# Patient Record
Sex: MALE | Race: ASIAN | NOT HISPANIC OR LATINO | Employment: FULL TIME | URBAN - METROPOLITAN AREA
[De-identification: names, ages, dates, MRNs, and addresses within clinical notes are randomized per-mention and may not be internally consistent; named-entity substitution may affect disease eponyms.]

---

## 2019-12-12 ENCOUNTER — OFFICE VISIT - HEALTHEAST (OUTPATIENT)
Dept: FAMILY MEDICINE | Facility: CLINIC | Age: 39
End: 2019-12-12

## 2019-12-12 ENCOUNTER — COMMUNICATION - HEALTHEAST (OUTPATIENT)
Dept: TELEHEALTH | Facility: CLINIC | Age: 39
End: 2019-12-12

## 2019-12-12 DIAGNOSIS — R29.810 FACIAL DROOP: ICD-10-CM

## 2019-12-12 DIAGNOSIS — I10 HYPERTENSION, UNSPECIFIED TYPE: ICD-10-CM

## 2021-06-02 ENCOUNTER — RECORDS - HEALTHEAST (OUTPATIENT)
Dept: ADMINISTRATIVE | Facility: CLINIC | Age: 41
End: 2021-06-02

## 2021-06-04 VITALS
OXYGEN SATURATION: 98 % | HEART RATE: 98 BPM | WEIGHT: 170.1 LBS | TEMPERATURE: 98 F | DIASTOLIC BLOOD PRESSURE: 112 MMHG | SYSTOLIC BLOOD PRESSURE: 156 MMHG | RESPIRATION RATE: 12 BRPM

## 2021-06-17 NOTE — PATIENT INSTRUCTIONS - HE
Patient Instructions by Geovanny Man DO at 12/12/2019 11:30 AM     Author: Geovanny Man DO Service: -- Author Type: Physician    Filed: 12/12/2019 11:57 AM Encounter Date: 12/12/2019 Status: Addendum    : Geovanny Man DO (Physician)    Related Notes: Original Note by Geovanny Man DO (Physician) filed at 12/12/2019 11:57 AM       I think you likely have Bell's palsy, but the combination of these symptoms, plus your hypertension warrants a more in-depth evaluation in the emergency department I believe.  I spoke to the physician on duty at [facility] ER about your recent symptoms, and my exam findings.  They will continue your management there.  See handout for treatment advice.    Patient Education     High Blood Pressure, To Be Confirmed, No Treatment  Your blood pressure today was higher than normal. Sometimes anxiety or pain can cause a temporary rise in blood pressure. It later returns to normal. Blood pressure that is high only one time doesnt mean that you have high blood pressure (hypertension). High blood pressure is a chronic illness. But you should have your blood pressure measured again within the next few days to find out if its still high.    Blood pressure measurements are given as 2 numbers. Systolic blood pressure is the upper number. This is the pressure when the heart contracts. Diastolic blood pressure is the lower number. This is the pressure when the heart relaxes between beats. You will see your blood pressure readings written together. For example, a person with a systolic pressure of 118 and a diastolic pressure of 78 will have 118/78 written in the medical record.  Blood pressure is categorized as normal, elevated, or stage 1 or stage 2 high blood pressure:    Normal blood pressure is systolic of less than 120 and diastolic of less than 80 (120/80)    Elevated blood pressure is systolic of 120 to 129 and diastolic less than 80    Stage 1 high blood pressure is  systolic is 130 to 139 or diastolic between 80 to 89    Stage 2 high blood pressure is when systolic is 140 or higher or the diastolic is 90 or higher  Lifestyle changes such as weight loss, exercise, and quitting smoking, can help manage your blood pressure. Have your blood pressure checked regularly to be sure it is under control.  Home care  To track your blood pressure, your provider may ask you to come into the office at different times and on different days. If your healthcare provider asks you to check your readings at home, ask him or her what times of the day to test and for how many days. Before you leave the office, ask your provider to show you how to take your blood pressure and be sure to ask questions if you don't understand something.  Consider buying an automatic blood pressure monitor. Ask your provider for a recommendation as well as the proper size cuff to fit your arm. You can buy blood pressure monitors at most pharmacies.  The American Heart Association recommends the following guidelines for home blood pressure monitoring:    Don't smoke or drink coffee or other caffeinated drinks for 30 minutes before taking your blood pressure.    Go to the bathroom before the test.    Relax for 5 minutes before taking the measurement.    Sit with your back supported (don't sit on a couch or soft chair); keep your feet on the floor uncrossed. Place your arm on a solid flat surface (like a table) with the upper part of the arm at heart level. Place the middle of the cuff directly above the bend of the elbow. Check the monitor's instruction manual for an illustration.    Take multiple readings. When you measure, take 2 to 3 readings one minute apart and record all of the results.    Take your blood pressure at the same time every day, or as your healthcare provider recommends.    Record the date, time, and blood pressure reading.    Take the record with you to your next medical appointment. If your blood  pressure monitor has a built-in memory, simply take the monitor with you to your next appointment.    Call your provider if you have several high readings. Don't be frightened by a single high blood pressure reading, but if you get several high readings, check in with your healthcare provider.    Note: When blood pressure reaches a systolic (top number) of 180 or higher OR diastolic (bottom number) of 110 or higher, seek emergency medical treatment.  Follow-up care  Keep all of your follow up appointments. If your blood pressure is more than 120 over 80 on 2 out of 3 days, you will need to follow up with your healthcare provider for more evaluation and treatment.  Dont put this off! High blood pressure can be treated. High blood pressure thats not treated raises your risk for heart attack, heart failure, and stroke.  When to seek medical advice  Call your healthcare provider right away if any of these occur:    Blood pressure reaches a systolic (top number) of 180 or higher, OR diastolic (bottom number) of 110 or higher    Chest pain or shortness of breath    Severe headache    Throbbing or rushing sound in the ears    Nosebleed    Sudden severe pain in your belly (abdomen)    Extreme drowsiness, confusion, or fainting    Dizziness or dizziness with spinning sensation (vertigo)    Weakness of an arm or leg or one side of the face    You have problems speaking or seeing   Date Last Reviewed: 12/1/2016 2000-2017 [employer]. [address] [place], [place] [id number]. All rights reserved. This information is not intended as a substitute for professional medical care. Always follow your healthcare professional's instructions.           Patient Education     [place] Palsy    V[place]adares's Palsy is a problem involving the nerve that controls the muscles on one side of the face.  The cause is unknown, but may be related to inflammation of the nerve. Symptoms usually appear only on the affected side. They may  include:    Inability to close the eyelid    Tearing of the eye    Facial drooping    Drooling    Numbness or pain    Changes in taste    Sound sensitivity  Damage to the eye can be a serious problem. The inability to blink can cause the eye to dry out. An ulcer (sore) can then form on the cornea. Also, not blinking means that the eye has no protection from dirt and dust particles.  Treatment involves protecting and moistening the eye. Medicines may also help.  Most persons recover completely within 3 to 6 months. However, the condition sometimes returns months or years later.  Home care    Get plenty of rest and eat a healthy diet to help yourself recover.    Use Artificial Tears frequently during the day and at bedtime to prevent drying. These drops are available without prescription at your drug store.    Wear protective glasses especially when outside to protect from flying debris. Use sunglasses when outdoors.    Tape the eyelid closed at bedtime with a paper tape (available at your pharmacy). This has a very mild adhesive to avoid injury to the lid. This will protect your eye from injury while you sleep.    Sometimes medicines are prescribed to reduce inflammation or treat specific viral infections of the nerve. If medicines are prescribed, take them exactly as directed. Usually there is a 10-day course of medication that is started as soon as possible. Taking this medicine as prescribed will help with a full recovery.    Use low heat, for example from a heating pad, on the affected area. This can help reduce pain and swelling.    If you are experiencing sever pain, contact your health care provider.  Follow-up care  Follow up with your healthcare provider as advised. If you referred to a specialist, make that appointment promptly.  When to seek medical advice  Call your healthcare provider if any of the following occur:    Severe eye redness    Eye pain    Thick drainage from the eye    Change in vision (such  as double vision or losing vision)    Fever over 100.4 F (38 C) or as directed by your healthcare provider    Headache, neck pain, weakness, trouble speaking or walking, or other unexplained symptoms  Date Last Reviewed: 8/23/2015 2000-2017 [employer]. [address] [place] [id number]. All rights reserved. This information is not intended as a substitute for professional medical care. Always follow your healthcare professional's instructions.

## 2021-09-02 ENCOUNTER — ANCILLARY PROCEDURE (OUTPATIENT)
Dept: ULTRASOUND IMAGING | Facility: HOSPITAL | Age: 41
End: 2021-09-02
Attending: EMERGENCY MEDICINE
Payer: COMMERCIAL

## 2021-09-02 ENCOUNTER — HOSPITAL ENCOUNTER (EMERGENCY)
Facility: HOSPITAL | Age: 41
Discharge: ANOTHER HOSPITAL | End: 2021-09-03
Attending: EMERGENCY MEDICINE
Payer: COMMERCIAL

## 2021-09-02 ENCOUNTER — APPOINTMENT (OUTPATIENT)
Dept: CT IMAGING | Facility: HOSPITAL | Age: 41
End: 2021-09-02
Attending: EMERGENCY MEDICINE
Payer: COMMERCIAL

## 2021-09-02 DIAGNOSIS — S02.2XXB OPEN FRACTURE OF NASAL BONE, INITIAL ENCOUNTER: ICD-10-CM

## 2021-09-02 DIAGNOSIS — F10.920 ALCOHOLIC INTOXICATION WITHOUT COMPLICATION (H): ICD-10-CM

## 2021-09-02 DIAGNOSIS — S01.81XA FACIAL LACERATION, INITIAL ENCOUNTER: ICD-10-CM

## 2021-09-02 DIAGNOSIS — S06.6X1A TRAUMATIC SUBARACHNOID HEMORRHAGE WITH LOSS OF CONSCIOUSNESS OF 30 MINUTES OR LESS, INITIAL ENCOUNTER (H): ICD-10-CM

## 2021-09-02 LAB
ERYTHROCYTE [DISTWIDTH] IN BLOOD BY AUTOMATED COUNT: 12.4 % (ref 10–15)
HCT VFR BLD AUTO: 44.2 % (ref 40–53)
HGB BLD-MCNC: 15.2 G/DL (ref 13.3–17.7)
MCH RBC QN AUTO: 32.7 PG (ref 26.5–33)
MCHC RBC AUTO-ENTMCNC: 34.4 G/DL (ref 31.5–36.5)
MCV RBC AUTO: 95 FL (ref 78–100)
PLATELET # BLD AUTO: 284 10E3/UL (ref 150–450)
RBC # BLD AUTO: 4.65 10E6/UL (ref 4.4–5.9)
WBC # BLD AUTO: 16.8 10E3/UL (ref 4–11)

## 2021-09-02 PROCEDURE — 70450 CT HEAD/BRAIN W/O DYE: CPT

## 2021-09-02 PROCEDURE — 96375 TX/PRO/DX INJ NEW DRUG ADDON: CPT

## 2021-09-02 PROCEDURE — 70486 CT MAXILLOFACIAL W/O DYE: CPT

## 2021-09-02 PROCEDURE — 96365 THER/PROPH/DIAG IV INF INIT: CPT

## 2021-09-02 PROCEDURE — 82077 ASSAY SPEC XCP UR&BREATH IA: CPT | Performed by: EMERGENCY MEDICINE

## 2021-09-02 PROCEDURE — 36415 COLL VENOUS BLD VENIPUNCTURE: CPT | Performed by: EMERGENCY MEDICINE

## 2021-09-02 PROCEDURE — 12011 RPR F/E/E/N/L/M 2.5 CM/<: CPT

## 2021-09-02 PROCEDURE — 80048 BASIC METABOLIC PNL TOTAL CA: CPT | Performed by: EMERGENCY MEDICINE

## 2021-09-02 PROCEDURE — C9803 HOPD COVID-19 SPEC COLLECT: HCPCS

## 2021-09-02 PROCEDURE — 99285 EMERGENCY DEPT VISIT HI MDM: CPT | Mod: 25

## 2021-09-02 PROCEDURE — 76705 ECHO EXAM OF ABDOMEN: CPT

## 2021-09-02 PROCEDURE — 85027 COMPLETE CBC AUTOMATED: CPT | Performed by: EMERGENCY MEDICINE

## 2021-09-02 PROCEDURE — 85610 PROTHROMBIN TIME: CPT | Performed by: EMERGENCY MEDICINE

## 2021-09-02 ASSESSMENT — MIFFLIN-ST. JEOR: SCORE: 1575.32

## 2021-09-03 ENCOUNTER — APPOINTMENT (OUTPATIENT)
Dept: CT IMAGING | Facility: CLINIC | Age: 41
End: 2021-09-03
Attending: EMERGENCY MEDICINE
Payer: COMMERCIAL

## 2021-09-03 ENCOUNTER — APPOINTMENT (OUTPATIENT)
Dept: RADIOLOGY | Facility: HOSPITAL | Age: 41
End: 2021-09-03
Attending: EMERGENCY MEDICINE
Payer: COMMERCIAL

## 2021-09-03 ENCOUNTER — APPOINTMENT (OUTPATIENT)
Dept: CT IMAGING | Facility: HOSPITAL | Age: 41
End: 2021-09-03
Attending: EMERGENCY MEDICINE
Payer: COMMERCIAL

## 2021-09-03 ENCOUNTER — APPOINTMENT (OUTPATIENT)
Dept: GENERAL RADIOLOGY | Facility: CLINIC | Age: 41
End: 2021-09-03
Attending: PHYSICIAN ASSISTANT
Payer: COMMERCIAL

## 2021-09-03 ENCOUNTER — APPOINTMENT (OUTPATIENT)
Dept: OCCUPATIONAL THERAPY | Facility: CLINIC | Age: 41
End: 2021-09-03
Attending: SURGERY
Payer: COMMERCIAL

## 2021-09-03 ENCOUNTER — HOSPITAL ENCOUNTER (OUTPATIENT)
Facility: CLINIC | Age: 41
Setting detail: OBSERVATION
Discharge: HOME | End: 2021-09-04
Attending: EMERGENCY MEDICINE | Admitting: SURGERY
Payer: COMMERCIAL

## 2021-09-03 VITALS
HEIGHT: 65 IN | SYSTOLIC BLOOD PRESSURE: 130 MMHG | HEART RATE: 95 BPM | OXYGEN SATURATION: 94 % | WEIGHT: 165 LBS | BODY MASS INDEX: 27.49 KG/M2 | DIASTOLIC BLOOD PRESSURE: 74 MMHG | RESPIRATION RATE: 22 BRPM

## 2021-09-03 DIAGNOSIS — Z11.52 ENCOUNTER FOR SCREENING LABORATORY TESTING FOR SEVERE ACUTE RESPIRATORY SYNDROME CORONAVIRUS 2 (SARS-COV-2): ICD-10-CM
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
## 2021-09-03 DIAGNOSIS — I10 HYPERTENSION, UNSPECIFIED TYPE: ICD-10-CM

## 2021-09-03 DIAGNOSIS — R40.2412 GLASGOW COMA SCALE SCORE 13-15, AT ARRIVAL TO EMERGENCY DEPARTMENT: ICD-10-CM

## 2021-09-03 DIAGNOSIS — G45.4 TRANSIENT GLOBAL AMNESIA: ICD-10-CM

## 2021-09-03 DIAGNOSIS — I60.9 SAH (SUBARACHNOID HEMORRHAGE) (H): ICD-10-CM

## 2021-09-03 DIAGNOSIS — I60.9 SUBARACHNOID HEMORRHAGE (H): Primary | ICD-10-CM
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
## 2021-09-03 DIAGNOSIS — S06.6X0A SUBARACHNOID HEMORRHAGE FOLLOWING INJURY, NO LOSS OF CONSCIOUSNESS, INITIAL ENCOUNTER (H): ICD-10-CM

## 2021-09-03 LAB
ALBUMIN SERPL-MCNC: 3.3 G/DL (ref 3.4–5)
ALP SERPL-CCNC: 87 U/L (ref 40–150)
ALT SERPL W P-5'-P-CCNC: 30 U/L (ref 0–70)
ANION GAP SERPL CALCULATED.3IONS-SCNC: 15 MMOL/L (ref 5–18)
ANION GAP SERPL CALCULATED.3IONS-SCNC: 9 MMOL/L (ref 3–14)
AST SERPL W P-5'-P-CCNC: 21 U/L (ref 0–45)
BILIRUB DIRECT SERPL-MCNC: 0.1 MG/DL (ref 0–0.2)
BILIRUB SERPL-MCNC: 0.9 MG/DL (ref 0.2–1.3)
BUN SERPL-MCNC: 10 MG/DL (ref 7–30)
BUN SERPL-MCNC: 11 MG/DL (ref 8–22)
CALCIUM SERPL-MCNC: 8.6 MG/DL (ref 8.5–10.1)
CALCIUM SERPL-MCNC: 9.2 MG/DL (ref 8.5–10.5)
CHLORIDE BLD-SCNC: 106 MMOL/L (ref 98–107)
CHLORIDE BLD-SCNC: 108 MMOL/L (ref 94–109)
CK SERPL-CCNC: 153 U/L (ref 30–300)
CO2 SERPL-SCNC: 19 MMOL/L (ref 22–31)
CO2 SERPL-SCNC: 21 MMOL/L (ref 20–32)
CREAT SERPL-MCNC: 0.87 MG/DL (ref 0.66–1.25)
CREAT SERPL-MCNC: 0.95 MG/DL (ref 0.7–1.3)
ERYTHROCYTE [DISTWIDTH] IN BLOOD BY AUTOMATED COUNT: 12.5 % (ref 10–15)
ETHANOL SERPL-MCNC: 243 MG/DL
GFR SERPL CREATININE-BSD FRML MDRD: >90 ML/MIN/1.73M2
GFR SERPL CREATININE-BSD FRML MDRD: >90 ML/MIN/1.73M2
GLUCOSE BLD-MCNC: 104 MG/DL (ref 70–99)
GLUCOSE BLD-MCNC: 115 MG/DL (ref 70–125)
HCT VFR BLD AUTO: 43.8 % (ref 40–53)
HGB BLD-MCNC: 15.1 G/DL (ref 13.3–17.7)
INR PPP: 0.93 (ref 0.85–1.15)
INR PPP: 1 (ref 0.85–1.15)
LACTATE SERPL-SCNC: 0.7 MMOL/L (ref 0.7–2)
LACTATE SERPL-SCNC: 2.5 MMOL/L (ref 0.7–2)
MAGNESIUM SERPL-MCNC: 1.8 MG/DL (ref 1.6–2.3)
MAGNESIUM SERPL-MCNC: 2 MG/DL (ref 1.6–2.3)
MCH RBC QN AUTO: 32.4 PG (ref 26.5–33)
MCHC RBC AUTO-ENTMCNC: 34.5 G/DL (ref 31.5–36.5)
MCV RBC AUTO: 94 FL (ref 78–100)
PHOSPHATE SERPL-MCNC: 2.4 MG/DL (ref 2.5–4.5)
PLATELET # BLD AUTO: 274 10E3/UL (ref 150–450)
POTASSIUM BLD-SCNC: 3.1 MMOL/L (ref 3.5–5)
POTASSIUM BLD-SCNC: 3.7 MMOL/L (ref 3.4–5.3)
PROT SERPL-MCNC: 7.7 G/DL (ref 6.8–8.8)
RBC # BLD AUTO: 4.66 10E6/UL (ref 4.4–5.9)
SARS-COV-2 RNA RESP QL NAA+PROBE: NEGATIVE
SODIUM SERPL-SCNC: 138 MMOL/L (ref 133–144)
SODIUM SERPL-SCNC: 140 MMOL/L (ref 136–145)
TROPONIN I SERPL-MCNC: <0.015 UG/L (ref 0–0.04)
WBC # BLD AUTO: 20.5 10E3/UL (ref 4–11)
WBC # BLD AUTO: 20.6 10E3/UL (ref 4–11)

## 2021-09-03 PROCEDURE — 83735 ASSAY OF MAGNESIUM: CPT | Performed by: SURGERY

## 2021-09-03 PROCEDURE — 99285 EMERGENCY DEPT VISIT HI MDM: CPT | Mod: 25 | Performed by: EMERGENCY MEDICINE

## 2021-09-03 PROCEDURE — 90715 TDAP VACCINE 7 YRS/> IM: CPT | Performed by: EMERGENCY MEDICINE

## 2021-09-03 PROCEDURE — 97165 OT EVAL LOW COMPLEX 30 MIN: CPT | Mod: GO | Performed by: OCCUPATIONAL THERAPIST

## 2021-09-03 PROCEDURE — 84484 ASSAY OF TROPONIN QUANT: CPT | Performed by: PHYSICIAN ASSISTANT

## 2021-09-03 PROCEDURE — 250N000013 HC RX MED GY IP 250 OP 250 PS 637: Performed by: SURGERY

## 2021-09-03 PROCEDURE — 36415 COLL VENOUS BLD VENIPUNCTURE: CPT | Performed by: SURGERY

## 2021-09-03 PROCEDURE — 258N000003 HC RX IP 258 OP 636: Performed by: SURGERY

## 2021-09-03 PROCEDURE — 258N000003 HC RX IP 258 OP 636: Performed by: EMERGENCY MEDICINE

## 2021-09-03 PROCEDURE — 82248 BILIRUBIN DIRECT: CPT | Performed by: PHYSICIAN ASSISTANT

## 2021-09-03 PROCEDURE — 74019 RADEX ABDOMEN 2 VIEWS: CPT | Mod: 26 | Performed by: RADIOLOGY

## 2021-09-03 PROCEDURE — 83735 ASSAY OF MAGNESIUM: CPT | Performed by: PHYSICIAN ASSISTANT

## 2021-09-03 PROCEDURE — 96374 THER/PROPH/DIAG INJ IV PUSH: CPT

## 2021-09-03 PROCEDURE — 87635 SARS-COV-2 COVID-19 AMP PRB: CPT | Performed by: EMERGENCY MEDICINE

## 2021-09-03 PROCEDURE — 85610 PROTHROMBIN TIME: CPT | Performed by: PHYSICIAN ASSISTANT

## 2021-09-03 PROCEDURE — 83605 ASSAY OF LACTIC ACID: CPT | Performed by: SURGERY

## 2021-09-03 PROCEDURE — 250N000013 HC RX MED GY IP 250 OP 250 PS 637: Performed by: PHYSICIAN ASSISTANT

## 2021-09-03 PROCEDURE — 71046 X-RAY EXAM CHEST 2 VIEWS: CPT

## 2021-09-03 PROCEDURE — 682N000003 HC TRAUMA EVALUATION W/O CC LEVEL II: Performed by: EMERGENCY MEDICINE

## 2021-09-03 PROCEDURE — 250N000011 HC RX IP 250 OP 636: Performed by: SURGERY

## 2021-09-03 PROCEDURE — 70450 CT HEAD/BRAIN W/O DYE: CPT | Mod: 26 | Performed by: RADIOLOGY

## 2021-09-03 PROCEDURE — 99285 EMERGENCY DEPT VISIT HI MDM: CPT | Performed by: EMERGENCY MEDICINE

## 2021-09-03 PROCEDURE — 83605 ASSAY OF LACTIC ACID: CPT | Performed by: PHYSICIAN ASSISTANT

## 2021-09-03 PROCEDURE — G0378 HOSPITAL OBSERVATION PER HR: HCPCS

## 2021-09-03 PROCEDURE — 72125 CT NECK SPINE W/O DYE: CPT

## 2021-09-03 PROCEDURE — 82550 ASSAY OF CK (CPK): CPT | Performed by: PHYSICIAN ASSISTANT

## 2021-09-03 PROCEDURE — 85027 COMPLETE CBC AUTOMATED: CPT | Performed by: PHYSICIAN ASSISTANT

## 2021-09-03 PROCEDURE — 90471 IMMUNIZATION ADMIN: CPT | Performed by: EMERGENCY MEDICINE

## 2021-09-03 PROCEDURE — 74019 RADEX ABDOMEN 2 VIEWS: CPT

## 2021-09-03 PROCEDURE — 250N000009 HC RX 250: Performed by: PHYSICIAN ASSISTANT

## 2021-09-03 PROCEDURE — 71046 X-RAY EXAM CHEST 2 VIEWS: CPT | Mod: 26 | Performed by: RADIOLOGY

## 2021-09-03 PROCEDURE — 71045 X-RAY EXAM CHEST 1 VIEW: CPT

## 2021-09-03 PROCEDURE — 84100 ASSAY OF PHOSPHORUS: CPT | Performed by: PHYSICIAN ASSISTANT

## 2021-09-03 PROCEDURE — 97535 SELF CARE MNGMENT TRAINING: CPT | Mod: GO | Performed by: OCCUPATIONAL THERAPIST

## 2021-09-03 PROCEDURE — 85048 AUTOMATED LEUKOCYTE COUNT: CPT | Performed by: PHYSICIAN ASSISTANT

## 2021-09-03 PROCEDURE — 36415 COLL VENOUS BLD VENIPUNCTURE: CPT | Performed by: PHYSICIAN ASSISTANT

## 2021-09-03 PROCEDURE — 70450 CT HEAD/BRAIN W/O DYE: CPT

## 2021-09-03 PROCEDURE — 120N000002 HC R&B MED SURG/OB UMMC

## 2021-09-03 PROCEDURE — 82040 ASSAY OF SERUM ALBUMIN: CPT | Performed by: PHYSICIAN ASSISTANT

## 2021-09-03 PROCEDURE — 250N000011 HC RX IP 250 OP 636: Performed by: EMERGENCY MEDICINE

## 2021-09-03 RX ORDER — HYDRALAZINE HYDROCHLORIDE 20 MG/ML
10 INJECTION INTRAMUSCULAR; INTRAVENOUS EVERY 4 HOURS PRN
Status: DISCONTINUED | OUTPATIENT
Start: 2021-09-03 | End: 2021-09-04 | Stop reason: HOSPADM

## 2021-09-03 RX ORDER — NALOXONE HYDROCHLORIDE 0.4 MG/ML
0.2 INJECTION, SOLUTION INTRAMUSCULAR; INTRAVENOUS; SUBCUTANEOUS
Status: DISCONTINUED | OUTPATIENT
Start: 2021-09-03 | End: 2021-09-04 | Stop reason: HOSPADM

## 2021-09-03 RX ORDER — NICOTINE 21 MG/24HR
1 PATCH, TRANSDERMAL 24 HOURS TRANSDERMAL DAILY
Status: DISCONTINUED | OUTPATIENT
Start: 2021-09-03 | End: 2021-09-04 | Stop reason: HOSPADM

## 2021-09-03 RX ORDER — LANOLIN ALCOHOL/MO/W.PET/CERES
100 CREAM (GRAM) TOPICAL DAILY
Status: DISCONTINUED | OUTPATIENT
Start: 2021-09-03 | End: 2021-09-04 | Stop reason: HOSPADM

## 2021-09-03 RX ORDER — CLONIDINE HYDROCHLORIDE 0.1 MG/1
0.1 TABLET ORAL 2 TIMES DAILY
Status: DISCONTINUED | OUTPATIENT
Start: 2021-09-03 | End: 2021-09-04 | Stop reason: HOSPADM

## 2021-09-03 RX ORDER — SODIUM CHLORIDE, SODIUM LACTATE, POTASSIUM CHLORIDE, CALCIUM CHLORIDE 600; 310; 30; 20 MG/100ML; MG/100ML; MG/100ML; MG/100ML
1000 INJECTION, SOLUTION INTRAVENOUS CONTINUOUS
Status: DISCONTINUED | OUTPATIENT
Start: 2021-09-03 | End: 2021-09-04 | Stop reason: HOSPADM

## 2021-09-03 RX ORDER — METOPROLOL TARTRATE 1 MG/ML
5 INJECTION, SOLUTION INTRAVENOUS EVERY 5 MIN PRN
Status: DISCONTINUED | OUTPATIENT
Start: 2021-09-03 | End: 2021-09-04 | Stop reason: HOSPADM

## 2021-09-03 RX ORDER — VITAMIN B COMPLEX
50 TABLET ORAL DAILY
Status: DISCONTINUED | OUTPATIENT
Start: 2021-09-03 | End: 2021-09-04 | Stop reason: HOSPADM

## 2021-09-03 RX ORDER — QUETIAPINE FUMARATE 25 MG/1
25-50 TABLET, FILM COATED ORAL EVERY 6 HOURS PRN
Status: DISCONTINUED | OUTPATIENT
Start: 2021-09-03 | End: 2021-09-04 | Stop reason: HOSPADM

## 2021-09-03 RX ORDER — MULTIPLE VITAMINS W/ MINERALS TAB 9MG-400MCG
1 TAB ORAL DAILY
Status: DISCONTINUED | OUTPATIENT
Start: 2021-09-03 | End: 2021-09-04 | Stop reason: HOSPADM

## 2021-09-03 RX ORDER — ACETAMINOPHEN 325 MG/1
975 TABLET ORAL 3 TIMES DAILY
Status: DISCONTINUED | OUTPATIENT
Start: 2021-09-03 | End: 2021-09-04

## 2021-09-03 RX ORDER — NALOXONE HYDROCHLORIDE 0.4 MG/ML
0.4 INJECTION, SOLUTION INTRAMUSCULAR; INTRAVENOUS; SUBCUTANEOUS
Status: DISCONTINUED | OUTPATIENT
Start: 2021-09-03 | End: 2021-09-04 | Stop reason: HOSPADM

## 2021-09-03 RX ORDER — POTASSIUM CHLORIDE 750 MG/1
20 TABLET, EXTENDED RELEASE ORAL ONCE
Status: COMPLETED | OUTPATIENT
Start: 2021-09-03 | End: 2021-09-03

## 2021-09-03 RX ORDER — FOLIC ACID 1 MG/1
1 TABLET ORAL DAILY
Status: DISCONTINUED | OUTPATIENT
Start: 2021-09-03 | End: 2021-09-04 | Stop reason: HOSPADM

## 2021-09-03 RX ORDER — CEFAZOLIN SODIUM 1 G/3ML
1 INJECTION, POWDER, FOR SOLUTION INTRAMUSCULAR; INTRAVENOUS ONCE
Status: COMPLETED | OUTPATIENT
Start: 2021-09-03 | End: 2021-09-03

## 2021-09-03 RX ORDER — METHOCARBAMOL 750 MG/1
750 TABLET, FILM COATED ORAL 3 TIMES DAILY
Status: DISCONTINUED | OUTPATIENT
Start: 2021-09-03 | End: 2021-09-04

## 2021-09-03 RX ORDER — ONDANSETRON 2 MG/ML
4 INJECTION INTRAMUSCULAR; INTRAVENOUS EVERY 6 HOURS PRN
Status: DISCONTINUED | OUTPATIENT
Start: 2021-09-03 | End: 2021-09-04 | Stop reason: HOSPADM

## 2021-09-03 RX ORDER — ONDANSETRON 4 MG/1
4 TABLET, ORALLY DISINTEGRATING ORAL EVERY 6 HOURS PRN
Status: DISCONTINUED | OUTPATIENT
Start: 2021-09-03 | End: 2021-09-04 | Stop reason: HOSPADM

## 2021-09-03 RX ADMIN — CLOSTRIDIUM TETANI TOXOID ANTIGEN (FORMALDEHYDE INACTIVATED), CORYNEBACTERIUM DIPHTHERIAE TOXOID ANTIGEN (FORMALDEHYDE INACTIVATED), BORDETELLA PERTUSSIS TOXOID ANTIGEN (GLUTARALDEHYDE INACTIVATED), BORDETELLA PERTUSSIS FILAMENTOUS HEMAGGLUTININ ANTIGEN (FORMALDEHYDE INACTIVATED), BORDETELLA PERTUSSIS PERTACTIN ANTIGEN, AND BORDETELLA PERTUSSIS FIMBRIAE 2/3 ANTIGEN 0.5 ML: 5; 2; 2.5; 5; 3; 5 INJECTION, SUSPENSION INTRAMUSCULAR at 00:49

## 2021-09-03 RX ADMIN — CLONIDINE HYDROCHLORIDE 0.1 MG: 0.1 TABLET ORAL at 19:36

## 2021-09-03 RX ADMIN — ONDANSETRON 4 MG: 4 TABLET, ORALLY DISINTEGRATING ORAL at 08:38

## 2021-09-03 RX ADMIN — POTASSIUM & SODIUM PHOSPHATES POWDER PACK 280-160-250 MG 1 PACKET: 280-160-250 PACK at 19:35

## 2021-09-03 RX ADMIN — Medication 50 MCG: at 07:48

## 2021-09-03 RX ADMIN — SODIUM CHLORIDE, POTASSIUM CHLORIDE, SODIUM LACTATE AND CALCIUM CHLORIDE 1000 ML: 600; 310; 30; 20 INJECTION, SOLUTION INTRAVENOUS at 05:26

## 2021-09-03 RX ADMIN — FOLIC ACID 1 MG: 1 TABLET ORAL at 09:47

## 2021-09-03 RX ADMIN — POTASSIUM CHLORIDE 20 MEQ: 750 TABLET, EXTENDED RELEASE ORAL at 12:24

## 2021-09-03 RX ADMIN — CEFAZOLIN 1 G: 1 INJECTION, POWDER, FOR SOLUTION INTRAMUSCULAR; INTRAVENOUS at 01:11

## 2021-09-03 RX ADMIN — POTASSIUM & SODIUM PHOSPHATES POWDER PACK 280-160-250 MG 1 PACKET: 280-160-250 PACK at 12:24

## 2021-09-03 RX ADMIN — METOPROLOL TARTRATE 5 MG: 1 INJECTION, SOLUTION INTRAVENOUS at 08:38

## 2021-09-03 RX ADMIN — LEVETIRACETAM 1000 MG: 100 INJECTION, SOLUTION INTRAVENOUS at 01:31

## 2021-09-03 RX ADMIN — METHOCARBAMOL 750 MG: 750 TABLET ORAL at 14:03

## 2021-09-03 RX ADMIN — ACETAMINOPHEN 975 MG: 325 TABLET, FILM COATED ORAL at 19:35

## 2021-09-03 RX ADMIN — ACETAMINOPHEN 975 MG: 325 TABLET, FILM COATED ORAL at 07:48

## 2021-09-03 RX ADMIN — METHOCARBAMOL 750 MG: 750 TABLET ORAL at 09:47

## 2021-09-03 RX ADMIN — CLONIDINE HYDROCHLORIDE 0.1 MG: 0.1 TABLET ORAL at 09:47

## 2021-09-03 RX ADMIN — THIAMINE HCL TAB 100 MG 100 MG: 100 TAB at 09:47

## 2021-09-03 RX ADMIN — METHOCARBAMOL 750 MG: 750 TABLET ORAL at 19:36

## 2021-09-03 RX ADMIN — MULTIPLE VITAMINS W/ MINERALS TAB 1 TABLET: TAB at 09:47

## 2021-09-03 RX ADMIN — ACETAMINOPHEN 975 MG: 325 TABLET, FILM COATED ORAL at 14:03

## 2021-09-03 ASSESSMENT — ACTIVITIES OF DAILY LIVING (ADL)
HEARING_DIFFICULTY_OR_DEAF: NO
DIFFICULTY_COMMUNICATING: NO
TOILETING_ISSUES: NO
ADLS_ACUITY_SCORE: 14
ADLS_ACUITY_SCORE: 14
DRESSING/BATHING_DIFFICULTY: NO
CONCENTRATING,_REMEMBERING_OR_MAKING_DECISIONS_DIFFICULTY: NO
WALKING_OR_CLIMBING_STAIRS_DIFFICULTY: NO
WEAR_GLASSES_OR_BLIND: NO
ADLS_ACUITY_SCORE: 14
DIFFICULTY_EATING/SWALLOWING: NO
DOING_ERRANDS_INDEPENDENTLY_DIFFICULTY: NO
FALL_HISTORY_WITHIN_LAST_SIX_MONTHS: NO
PREVIOUS_RESPONSIBILITIES: HOUSEKEEPING;LAUNDRY;SHOPPING;YARDWORK;MEDICATION MANAGEMENT;FINANCES;DRIVING;WORK
ADLS_ACUITY_SCORE: 14

## 2021-09-03 ASSESSMENT — MIFFLIN-ST. JEOR: SCORE: 1559.44

## 2021-09-03 NOTE — PROVIDER NOTIFICATION
Lab called with LA=2.5 FL=680 WBC=16.8  Code sepsis called, waiting for team to arrive.  Trauma PA (Dorcas Erickson) paged, she called back and no further interventions at this time were added at this time.   Pt resting comfortably, IV fluids currently running. Assigned RN (Huber) notified.   CN notified and updated on situation.
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
## 2021-09-03 NOTE — PLAN OF CARE
0600: Writer paged trauma team.   [id number] [id number]. Sharla. Pt arrived to the floor and met criteria for sepsis for WBC 16.8, .  Thank you, JEFF Kim [phone].
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
## 2021-09-03 NOTE — PHARMACY-ADMISSION MEDICATION HISTORY
Admission Medication History Completed by Pharmacy    See [facility] Admission Navigator for allergy information, preferred outpatient pharmacy, prior to admission medications and immunization status.     Medication History Sources:     Patient    Changes made to PTA medication list (reason):    Added: None    Deleted: methylprednisolone (course completed)    Changed: None    Additional Information:    None    Prior to Admission medications    Not on File       Date completed: 09/03/21    Medication history completed by: Isela Medellin PharmD, BCPS  9/3/2021 8:57 AM

## 2021-09-03 NOTE — ED NOTES
Bed: ED02  Expected date:   Expected time:   Means of arrival:   Comments:  Yumi Harrell8 [facility]
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
## 2021-09-04 VITALS
HEART RATE: 77 BPM | DIASTOLIC BLOOD PRESSURE: 107 MMHG | WEIGHT: 165 LBS | TEMPERATURE: 96.7 F | BODY MASS INDEX: 28.17 KG/M2 | OXYGEN SATURATION: 95 % | RESPIRATION RATE: 16 BRPM | HEIGHT: 64 IN | SYSTOLIC BLOOD PRESSURE: 153 MMHG

## 2021-09-04 DIAGNOSIS — I60.9 SAH (SUBARACHNOID HEMORRHAGE) (H): Primary | ICD-10-CM

## 2021-09-04 LAB
ALBUMIN SERPL-MCNC: 3.1 G/DL (ref 3.4–5)
ALP SERPL-CCNC: 83 U/L (ref 40–150)
ALT SERPL W P-5'-P-CCNC: 24 U/L (ref 0–70)
ANION GAP SERPL CALCULATED.3IONS-SCNC: 7 MMOL/L (ref 3–14)
AST SERPL W P-5'-P-CCNC: 16 U/L (ref 0–45)
BILIRUB SERPL-MCNC: 1.4 MG/DL (ref 0.2–1.3)
BUN SERPL-MCNC: 12 MG/DL (ref 7–30)
CALCIUM SERPL-MCNC: 8.9 MG/DL (ref 8.5–10.1)
CHLORIDE BLD-SCNC: 106 MMOL/L (ref 94–109)
CO2 SERPL-SCNC: 25 MMOL/L (ref 20–32)
CREAT SERPL-MCNC: 0.98 MG/DL (ref 0.66–1.25)
ERYTHROCYTE [DISTWIDTH] IN BLOOD BY AUTOMATED COUNT: 12.4 % (ref 10–15)
GFR SERPL CREATININE-BSD FRML MDRD: >90 ML/MIN/1.73M2
GLUCOSE BLD-MCNC: 100 MG/DL (ref 70–99)
HCT VFR BLD AUTO: 42.1 % (ref 40–53)
HGB BLD-MCNC: 14.5 G/DL (ref 13.3–17.7)
MCH RBC QN AUTO: 32.7 PG (ref 26.5–33)
MCHC RBC AUTO-ENTMCNC: 34.4 G/DL (ref 31.5–36.5)
MCV RBC AUTO: 95 FL (ref 78–100)
PLATELET # BLD AUTO: 261 10E3/UL (ref 150–450)
POTASSIUM BLD-SCNC: 4 MMOL/L (ref 3.4–5.3)
PROT SERPL-MCNC: 7.6 G/DL (ref 6.8–8.8)
RBC # BLD AUTO: 4.44 10E6/UL (ref 4.4–5.9)
SODIUM SERPL-SCNC: 138 MMOL/L (ref 133–144)
WBC # BLD AUTO: 16.6 10E3/UL (ref 4–11)

## 2021-09-04 PROCEDURE — 250N000013 HC RX MED GY IP 250 OP 250 PS 637: Performed by: PHYSICIAN ASSISTANT

## 2021-09-04 PROCEDURE — G0378 HOSPITAL OBSERVATION PER HR: HCPCS

## 2021-09-04 PROCEDURE — 96375 TX/PRO/DX INJ NEW DRUG ADDON: CPT

## 2021-09-04 PROCEDURE — 250N000011 HC RX IP 250 OP 636: Performed by: PHYSICIAN ASSISTANT

## 2021-09-04 PROCEDURE — 80053 COMPREHEN METABOLIC PANEL: CPT | Performed by: PHYSICIAN ASSISTANT

## 2021-09-04 PROCEDURE — 85027 COMPLETE CBC AUTOMATED: CPT | Performed by: PHYSICIAN ASSISTANT

## 2021-09-04 PROCEDURE — 99239 HOSP IP/OBS DSCHRG MGMT >30: CPT | Performed by: PHYSICIAN ASSISTANT

## 2021-09-04 PROCEDURE — 250N000013 HC RX MED GY IP 250 OP 250 PS 637: Performed by: SURGERY

## 2021-09-04 PROCEDURE — 258N000003 HC RX IP 258 OP 636: Performed by: SURGERY

## 2021-09-04 PROCEDURE — 36415 COLL VENOUS BLD VENIPUNCTURE: CPT | Performed by: PHYSICIAN ASSISTANT

## 2021-09-04 RX ORDER — CHLORTHALIDONE 25 MG/1
25 TABLET ORAL DAILY
Status: DISCONTINUED | OUTPATIENT
Start: 2021-09-04 | End: 2021-09-04 | Stop reason: HOSPADM

## 2021-09-04 RX ORDER — LEVETIRACETAM 750 MG/1
750 TABLET ORAL 2 TIMES DAILY
Qty: 8 TABLET | Refills: 0 | Status: SHIPPED | OUTPATIENT
Start: 2021-09-04 | End: 2023-10-05

## 2021-09-04 RX ORDER — ACETAMINOPHEN 325 MG/1
975 TABLET ORAL EVERY 8 HOURS PRN
Status: DISCONTINUED | OUTPATIENT
Start: 2021-09-04 | End: 2021-09-04 | Stop reason: HOSPADM

## 2021-09-04 RX ORDER — METHOCARBAMOL 750 MG/1
750 TABLET, FILM COATED ORAL 3 TIMES DAILY PRN
Qty: 20 TABLET | Refills: 0 | Status: SHIPPED | OUTPATIENT
Start: 2021-09-04

## 2021-09-04 RX ORDER — CHLORTHALIDONE 25 MG/1
25 TABLET ORAL DAILY
Qty: 30 TABLET | Refills: 0 | Status: SHIPPED | OUTPATIENT
Start: 2021-09-04

## 2021-09-04 RX ORDER — METHOCARBAMOL 750 MG/1
750 TABLET, FILM COATED ORAL 3 TIMES DAILY PRN
Status: DISCONTINUED | OUTPATIENT
Start: 2021-09-04 | End: 2021-09-04 | Stop reason: HOSPADM

## 2021-09-04 RX ORDER — LEVETIRACETAM 750 MG/1
750 TABLET ORAL 2 TIMES DAILY
Qty: 8 TABLET | Refills: 0 | Status: SHIPPED | OUTPATIENT
Start: 2021-09-04 | End: 2021-09-08

## 2021-09-04 RX ADMIN — MULTIPLE VITAMINS W/ MINERALS TAB 1 TABLET: TAB at 08:07

## 2021-09-04 RX ADMIN — HYDRALAZINE HYDROCHLORIDE 10 MG: 20 INJECTION INTRAMUSCULAR; INTRAVENOUS at 08:16

## 2021-09-04 RX ADMIN — Medication 50 MCG: at 08:07

## 2021-09-04 RX ADMIN — CLONIDINE HYDROCHLORIDE 0.1 MG: 0.1 TABLET ORAL at 08:07

## 2021-09-04 RX ADMIN — FOLIC ACID 1 MG: 1 TABLET ORAL at 08:07

## 2021-09-04 RX ADMIN — POTASSIUM & SODIUM PHOSPHATES POWDER PACK 280-160-250 MG 1 PACKET: 280-160-250 PACK at 08:07

## 2021-09-04 RX ADMIN — THIAMINE HCL TAB 100 MG 100 MG: 100 TAB at 08:07

## 2021-09-04 RX ADMIN — SODIUM CHLORIDE, POTASSIUM CHLORIDE, SODIUM LACTATE AND CALCIUM CHLORIDE 1000 ML: 600; 310; 30; 20 INJECTION, SOLUTION INTRAVENOUS at 01:08

## 2021-09-04 RX ADMIN — CHLORTHALIDONE 25 MG: 25 TABLET ORAL at 11:53

## 2021-09-04 ASSESSMENT — ACTIVITIES OF DAILY LIVING (ADL)
ADLS_ACUITY_SCORE: 14

## 2021-09-04 NOTE — PLAN OF CARE
Occupational Therapy Discharge Summary    Reason for therapy discharge:    Discharged to home.    Progress towards therapy goal(s). See goals on Care Plan in [facility] electronic health record for goal details.  Goals partially met.  Barriers to achieving goals:   discharge from facility.    Therapy recommendation(s):    Not seen for therapies today. Previous therapist recommending assist for heavier ADL/IADL prn.
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
## 2021-09-04 NOTE — PROGRESS NOTES
Brief SW Note:    SW reviewed PT/OT notes/recs, no acute needs for either. OT recs pt return home with assistance. SW spoke with bedside RN, pt discharging home in one hour being  by his Sister. SW spoke with pt, he denied having any SW needs. Consult cleared.    PADMINI Montgomery on 9/4/2021 at 11:10 AM

## 2021-09-04 NOTE — DISCHARGE INSTRUCTIONS
High Blood Pressure, New, Begin Treatment    Your blood pressure was high enough today to start treatment with medicines. Often healthcare providers don t know what causes high blood pressure (hypertension). But it can be controlled with lifestyle changes and medicines. High blood pressure usually has no symptoms. But it can sometimes cause headache, dizziness, blurred vision, a rushing sound in your ears, chest pain, or shortness of breath. But even without symptoms, high blood pressure that s not treated raises your risk for heart attack, heart failure, kidney disease, vascular disease, and stroke. High blood pressure is a serious health risk and shouldn t be ignored.    Blood pressure measurements are given as 2 numbers.    Systolic blood pressure is the upper number. This is the pressure when the heart contracts.    Diastolic blood pressure is the lower number. This is the pressure when the heart relaxes between beats.  You will see your blood pressure readings written together. For example, a person with a systolic pressure of 118 and a diastolic pressure of 78 will have 118/78 written in the medical record.   Blood pressure is categorized as normal, elevated, or stage 1 or stage 2 high blood pressure:    Normal blood pressure is systolic of less than 120 and diastolic of less than 80 (120/80)    Elevated blood pressure is systolic of 120 to 129 and diastolic less than 80    Stage 1 high blood pressure is systolic is 130 to 139 or diastolic between 80 to 89    Stage 2 high blood pressure is when systolic is 140 or higher or the diastolic is 90 or higher  Home care  If you have high blood pressure, do what's listed below to lower your blood pressure. If you are taking medicines for high blood pressure, these methods may reduce or end your need for medicines in the future.    Begin a weight-loss program if you are overweight.    Cut back on how much salt you get in your diet. Here s how to do this:  ? Don t eat  foods that have a lot of salt. These include olives, pickles, smoked meats, and salted potato chips.  ? Don t add salt to your food at the table.  ? Use only small amounts of salt when cooking.  ? Review food labels to track how much salt is in prepared foods.  ? When eating out, ask that no additional salt be added to your food order.  ? Ask your provider about the DASH diet or the DASH (dietary approaches to stop hypertension) eating plan.    Start an exercise program. Talk with your healthcare provider about the type of exercise program that would be best for you. It doesn't have to be hard. Even brisk walking for 20 minutes 3 times a week is a good form of exercise.    Don t take medicines that have heart stimulants. This includes many over-the-counter cold and sinus decongestant pills and sprays, as well as diet pills. Check the warnings about high blood pressure on the label. Before purchasing any over-the-counter medicines or supplements, always ask the pharmacist about the product's potential interaction with your high blood pressure and your high blood pressure medicines.    Stimulants such as amphetamine or cocaine could be lethal for someone with high blood pressure. Never take these.    Limit how much caffeine you get in your diet. Switch to caffeine-free products.    Stop smoking. If you are a long-time smoker, this can be hard. Enroll in a stop-smoking program to make it more likely that you will quit for good. Or, talk with your healthcare provider about nicotine replacements or medicines that can help.    Learn how to handle stress. This is an important part of any program to lower blood pressure. Learn about relaxation methods like meditation, yoga, or biofeedback.    If your provider prescribed medicines, take them exactly as directed. Missing doses may cause your blood pressure to get out of control.    If you miss a dose or doses, check with your healthcare provider or pharmacist about what to  do.    Limit alcohol. Drinking too much alcohol can raise blood pressure. Men should have no more than 2 drinks a day. Women should have no more than 1. A drink is equal to 1 beer, or a small glass of wine, or a shot of liquor..    Consider buying an automatic blood pressure machine so you can check your blood pressure regularly at home. Your provider can make a recommendation. You can get one of these at most pharmacies.  The American Heart Association recommends the following guidelines for home blood pressure monitoring:    Don't smoke or drink coffee or other caffeinated drinks or exercise for 30 minutes before taking your blood pressure.    Go to the bathroom before the test.    Relax for 5 minutes before taking the measurement.    Sit with your back supported (don't sit on a couch or soft chair); keep your feet on the floor uncrossed. Place your arm on a solid flat surface (like a table) with the upper part of the arm at heart level. Place the middle of the cuff directly above the bend of the elbow. Check the monitor's instruction manual for an illustration.    Take multiple readings. When you measure, take 2 to 3 readings one minute apart and record all of the results.    Take your blood pressure at the same time every day, or as your healthcare provider recommends.    Record the date, time, and blood pressure reading.    Take the record with you to your next medical appointment. If your blood pressure monitor has a built-in memory, simply take the monitor with you to your next appointment.    Call your provider if you have several high readings. Don't be frightened by a single high blood pressure reading, but if you get several high readings, check in with your healthcare provider.    Note: If blood pressure reaches a systolic (top number) of 180 or higher OR diastolic (bottom number) of 120 or higher, seek emergency medical treatment.  Follow-up care  Because a new blood pressure medicine was started today,  it s important that you have your blood pressure rechecked. This is to make sure that the medicine is working and that you have no serious side effects. Keep all your follow up appointments. Write down medicine and blood pressure questions and bring them to your next appointment. If you have pressing concerns about your new medicine or your blood pressure, call your provider. Unless told otherwise, follow up with your healthcare provider within the next 3 days.  When to seek medical care  Call your healthcare provider right away if any of these occur:    Blood pressure reaches a systolic (top number) of 180 or higher, OR diastolic (bottom number) of 120 or higher, seek emergency medical treatment.    Chest pain or shortness of breath    Severe headache    Throbbing or rushing sound in the ears    Nosebleed    Sudden severe pain in your belly (abdomen)    Extreme drowsiness, confusion, or fainting    Dizziness or dizziness with a spinning sensation (vertigo)    Weakness of an arm or leg or one side of the face    You have problems speaking or seeing   [employer] last reviewed this educational content on 12/1/2019 2000-2021 [employer]. All rights reserved. This information is not intended as a substitute for professional medical care. Always follow your healthcare professional's instructions.        Coping with Concussion  Concussion is also known as mild traumatic brain injury (MTBI). It is often caused by a blow to the head, or a fall. You may have been unconscious for a few seconds or minutes after the injury. Or maybe you were dazed, confused, or  saw stars.  After this, you thought you were OK. Now, weeks or months later, you re having symptoms that may be caused by a concussion. The good news is that, in most people,  these symptoms will likely go away on their own. Most people with a concussion recover fully, with no need for treatment.     A cold compress can help relieve a headache.    What is  a concussion?  A concussion is a mild form of brain injury. In some cases, the effects of a concussion go away within days of the injury. In others, symptoms may continue for a few months. Fortunately, a concussion is temporary. Even when symptoms stay for months, they do go away over time. If they don't, or if your symptoms are worse, contact your healthcare provider.  Symptoms of a concussion  You may have noticed some of these symptoms:    Headaches    Irritability and other changes in behavior    Problems remembering or concentrating    Dizziness or lack of coordination    Fatigue    Problems sleeping    Sensitivity to light and sound    Vision changes  NOTE: If you have severe symptoms or trouble functioning, talk with your healthcare provider right away. If you had a more serious head injury than a concussion, you likely need treatment. Be sure to see your healthcare provider for an evaluation.  What you can do  Since the effects of a concussion go away over time, there isn t a lot you need to do. Be assured that this problem is temporary. You ll likely have a full recovery. In the meantime, talk with your healthcare provider about ways to relieve any symptoms that are bothering you. These tips may help:    Don't return to sports or any activity that could cause you to hit your head until all symptoms are gone and you have been cleared by your doctor. A second head injury before fully recovering from the first one can lead to serious brain injury.    Return to normal activities of daily living and normal social interaction is encouraged to speed recovery.    Stress can make symptoms worse. Help calm yourself by resting in a quiet place and imagining a peaceful scene. Relax your muscles by soaking in a hot bath or taking a hot shower.    Take over-the-counter  acetaminophen to relieve headache pain. Take them as directed on the package. Don't take ibuprofen or aspirin after a head injury.    If you become  dizzy, sit or lie down in a safe place until the sensation passes. Don t drive when you feel dizzy or disoriented.    If you re having trouble sleeping, try to keep a regular sleep schedule. Go to bed and get up at the same time each day. Avoid or limit caffeine and nicotine. Also don't drink alcohol. It may help you sleep at first, but your sleep will not be restful.    Give yourself time to heal. Your recovery will take some time. When you have symptoms, remember that you won t feel this way forever. In time the symptoms will go away and you ll be back to yourself.  If you re not feeling better  The effects of a concussion often go away in 7 to 10 days and the vast majority of people who have had a concussion have recovered after 3 months. If you re not feeling better as time passes, there may be something else going on. If your symptoms don t go away or you notice new ones, talk with your healthcare provider. He or she can help you get the treatment you need.  [employer] last reviewed this educational content on 1/1/2018 2000-2021 [employer]. All rights reserved. This information is not intended as a substitute for professional medical care. Always follow your healthcare professional's instructions.
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
## 2021-09-07 ENCOUNTER — TELEPHONE (OUTPATIENT)
Dept: NEUROSURGERY | Facility: CLINIC | Age: 41
End: 2021-09-07

## 2021-09-07 NOTE — TELEPHONE ENCOUNTER
Writer NICHOL for pt to call back and schedule 2 week follow up.    Please schedule a visit in two weeks (around 9/16) with Brigitte Murphy or Natasha Wells, in person or virtual is good. Please also schedule CT (ordered) prior to visit, can be same day    Elisa Jacome

## 2021-09-21 NOTE — TELEPHONE ENCOUNTER
FUTURE VISIT INFORMATION      FUTURE VISIT INFORMATION:    Date: 9/28/2021    Time: 930am    Location: [facility]  REFERRAL INFORMATION:    Referring provider:  Dr. Madrid    Referring providers clinic:  [facility] ED     Reason for visit/diagnosis  ED Follow-up     RECORDS REQUESTED FROM:       Clinic name Comments Records Status Imaging Status   Internal ED Visit-9/2/2021, 9/3/2021    CT Head-9/28/2021, 9/3/2021, 9/2/2021    CT Cervical Spine-9/3/2021    CT Facial Bones-9/2/2021 Epic PACS

## 2021-09-28 ENCOUNTER — ANCILLARY PROCEDURE (OUTPATIENT)
Dept: CT IMAGING | Facility: CLINIC | Age: 41
End: 2021-09-28
Attending: STUDENT IN AN ORGANIZED HEALTH CARE EDUCATION/TRAINING PROGRAM
Payer: COMMERCIAL

## 2021-09-28 ENCOUNTER — PRE VISIT (OUTPATIENT)
Dept: NEUROSURGERY | Facility: CLINIC | Age: 41
End: 2021-09-28

## 2021-09-28 ENCOUNTER — OFFICE VISIT (OUTPATIENT)
Dept: NEUROSURGERY | Facility: CLINIC | Age: 41
End: 2021-09-28
Payer: COMMERCIAL

## 2021-09-28 VITALS
SYSTOLIC BLOOD PRESSURE: 147 MMHG | WEIGHT: 171 LBS | HEART RATE: 75 BPM | OXYGEN SATURATION: 96 % | BODY MASS INDEX: 29.35 KG/M2 | RESPIRATION RATE: 16 BRPM | DIASTOLIC BLOOD PRESSURE: 108 MMHG

## 2021-09-28 DIAGNOSIS — I60.9 SAH (SUBARACHNOID HEMORRHAGE) (H): ICD-10-CM

## 2021-09-28 DIAGNOSIS — I60.9 SUBARACHNOID HEMORRHAGE (H): Primary | ICD-10-CM

## 2021-09-28 PROCEDURE — 70450 CT HEAD/BRAIN W/O DYE: CPT | Mod: GC | Performed by: RADIOLOGY

## 2021-09-28 PROCEDURE — 99213 OFFICE O/P EST LOW 20 MIN: CPT | Performed by: PHYSICIAN ASSISTANT

## 2021-09-28 ASSESSMENT — PAIN SCALES - GENERAL: PAINLEVEL: NO PAIN (0)

## 2021-09-28 NOTE — LETTER
Date:December 2, 2021      Patient was self referred, no letter generated. Do not send.        [facility] Health Information       Past Medical History:   Diagnosis Date    Actinic keratosis     Anemia     Basal cell carcinoma     Carotid stenosis     CLL (chronic lymphocytic leukemia)     Diabetes mellitus 2014    Steroid related    Hyperlipidemia     Hypertension     Hypopituitarism     Hypothyroid     Immunosuppression 3/13/2015    Squamous Cell Carcinoma     on the right side of the face     Squamous cell carcinoma of skin of right temple 1/27/2016    Stroke     Syncope 2/12    Unspecified disorder of kidney and ureter        Past Surgical History:   Procedure Laterality Date    CAROTID ENDARTERECTOMY (L)  2/27/2007    EXCISION TURBINATE, SUBMUCOUS      FESS  9/16/2014    Mohs excision   3/23/2015, 2/24/2016    combined with WLE forehead    NASAL SEPTUM SURGERY  9/16/2014    right ureter surgery  1995    SENTINEL LYMPH NODE BIOPSY  3/23/2015, 2/24/2016    SINUS SURGERY  9/16/2014    septo, ESS, BITSMR    Transphenoidal surgery         Review of patient's allergies indicates:   Allergen Reactions    Ace inhibitors Swelling     angioedema    Divalproex Hives     Rash under arms, body creases       No current facility-administered medications on file prior to encounter.      Current Outpatient Medications on File Prior to Encounter   Medication Sig    ACCU-CHEK FASTCLIX Misc TEST  BLOOD  GLUCOSE FOUR TIMES DAILY    alcohol swabs PadM APPLY 1 PAD TOPICALLY  AS NEEDED.    aspirin 81 MG Chew Take 81 mg by mouth every Mon, Wed, Fri.    atorvastatin (LIPITOR) 20 MG tablet TAKE 1 TABLET EVERY DAY    budesonide (PULMICORT) 0.5 mg/2 mL nebulizer solution budesonide 0.5 mg/2 mL suspension for nebulization    chlorhexidine (PERIDEX) 0.12 % solution chlorhexidine gluconate 0.12 % mouthwash    clopidogrel (PLAVIX) 75 mg tablet Take 75 mg by mouth once daily.    cyanocobalamin, vitamin B-12, 5,000 mcg TbDL Take 1 tablet by mouth once daily.    cycloSPORINE (RESTASIS) 0.05 % ophthalmic emulsion Place 0.4 mLs (1 drop  total) into both eyes 2 (two) times daily.    DOCOSAHEXANOIC ACID/EPA (FISH OIL ORAL) Take 5 capsules by mouth once daily.     erythromycin (ROMYCIN) ophthalmic ointment Apply to eyelids and lashes OU QHS    fluorouracil (EFUDEX) 5 % cream Use hs for 2 weeks on right cheek    furosemide (LASIX) 20 MG tablet     guaifen/dextromethorphan/pe (ROBITUSSIN COUGH & COLD CF MAX ORAL) Take 15 mLs by mouth 2 (two) times daily.    levothyroxine (SYNTHROID) 125 MCG tablet TAKE 1 TABLET ONE TIME DAILY    losartan (COZAAR) 25 MG tablet Take 1 tablet (25 mg total) by mouth once daily.    multivitamin with minerals tablet Take 1 tablet by mouth once daily.    neomycin-polymyxin-dexamethasone (DEXACINE) 3.5 mg/g-10,000 unit/g-0.1 % Oint neomycin 3.5 mg/g-polymyxin B 10,000 unit/g-dexameth 0.1 % eye oint    neomycin-polymyxin-dexamethasone (MAXITROL) 3.5mg/mL-10,000 unit/mL-0.1 % DrpS neomycin-polymyxin-dexameth 3.5 mg/mL-10,000 unit/mL-0.1% eye drops    pantoprazole (PROTONIX) 40 MG tablet TAKE 1 TABLET EVERY DAY    predniSONE (DELTASONE) 2.5 MG tablet Take 1 tablet by mouth once daily. 5mg and 2.5mg qd    predniSONE (DELTASONE) 5 MG tablet Take 5 mg by mouth once daily.    sennosides/docusate sodium (EDEN-COLACE ORAL) Take 1 tablet by mouth once daily.    SYMBICORT 160-4.5 mcg/actuation HFAA Inhale 1 puff into the lungs as needed.    tazarotene (AVAGE) 0.1 % cream Apply topically every evening. (Patient taking differently: Apply topically as needed. )    tenofovir alafenamide (VEMLIDY) 25 mg Tab Take 25 mg by mouth once daily.    testosterone cypionate (DEPOTESTOTERONE CYPIONATE) 100 mg/mL injection Inject 0.5 mLs (50 mg total) into the muscle every 14 (fourteen) days.    VENTOLIN HFA 90 mcg/actuation inhaler Inhale 1 puff into the lungs as needed.     Family History     Problem Relation (Age of Onset)    Cancer Mother, Father, Brother    Colon cancer Father    No Known Problems Sister, Maternal Aunt, Maternal  "Uncle, Paternal Aunt, Paternal Uncle, Maternal Grandmother, Maternal Grandfather, Paternal Grandmother, Paternal Grandfather        Tobacco Use    Smoking status: Never Smoker    Smokeless tobacco: Never Used   Substance and Sexual Activity    Alcohol use: Yes     Alcohol/week: 2.0 standard drinks     Types: 1 Glasses of wine, 1 Cans of beer per week     Frequency: Never     Drinks per session: Patient refused     Binge frequency: Patient refused     Comment: "rarely"    Drug use: No    Sexual activity: Not Currently     Review of Systems   Constitutional: Positive for fatigue and fever. Negative for chills.   HENT: Negative for congestion, dental problem, postnasal drip and sinus pressure.    Eyes: Negative for redness and visual disturbance.   Respiratory: Positive for cough. Negative for choking and shortness of breath.    Cardiovascular: Negative for chest pain and palpitations.   Gastrointestinal: Negative for abdominal distention, abdominal pain, nausea and vomiting.   Endocrine: Negative for polydipsia and polyuria.   Genitourinary: Negative for difficulty urinating, dysuria and flank pain.   Musculoskeletal: Negative for back pain and gait problem.   Skin: Negative for color change and rash.   Neurological: Positive for weakness. Negative for light-headedness and headaches.   Psychiatric/Behavioral: Negative for agitation.     Objective:     Vital Signs (Most Recent):  Temp: 97.6 °F (36.4 °C) (11/09/19 1959)  Pulse: 70 (11/09/19 1810)  Resp: (!) 28 (11/09/19 1810)  BP: (!) 102/52 (11/09/19 1810)  SpO2: 99 % (11/09/19 1810) Vital Signs (24h Range):  Temp:  [97.6 °F (36.4 °C)-98.3 °F (36.8 °C)] 97.6 °F (36.4 °C)  Pulse:  [69-87] 70  Resp:  [20-28] 28  SpO2:  [93 %-99 %] 99 %  BP: ()/(44-52) 102/52     Weight: 63 kg (139 lb)  Body mass index is 19.39 kg/m².    Physical Exam   Constitutional: He is oriented to person, place, and time. No distress.   Chronically ill-appearing    HENT:   Head: " Normocephalic and atraumatic.   Eyes: Pupils are equal, round, and reactive to light.   Neck: Normal range of motion. No JVD present.   Cardiovascular: Normal rate and regular rhythm.   Pulmonary/Chest: Effort normal. He has rales.   Abdominal: Soft. Bowel sounds are normal. He exhibits no distension.   Musculoskeletal: Normal range of motion.   Neurological: He is alert and oriented to person, place, and time.   Skin: Skin is warm and dry. Capillary refill takes 2 to 3 seconds. He is not diaphoretic.   Psychiatric: He has a normal mood and affect.         CRANIAL NERVES     CN III, IV, VI   Pupils are equal, round, and reactive to light.       Significant Labs:   A1C:   Recent Labs   Lab 07/16/19  0200   HGBA1C 5.6     Bilirubin:   Recent Labs   Lab 10/11/19  0819 10/22/19  1138 10/24/19  1458 10/29/19  1024 11/09/19  1739   BILITOT 0.8 0.6 0.7 0.6 0.6     BMP:   Recent Labs   Lab 11/09/19 1739   *   *   K 5.0      CO2 23   BUN 51*   CREATININE 1.4   CALCIUM 8.8   MG 1.7     CBC:   Recent Labs   Lab 11/09/19 1739   WBC 9.57   HGB 11.2*   HCT 35.0*   PLT 99*     CMP:   Recent Labs   Lab 11/09/19 1739   *   K 5.0      CO2 23   *   BUN 51*   CREATININE 1.4   CALCIUM 8.8   PROT 5.0*   ALBUMIN 2.8*   BILITOT 0.6   ALKPHOS 119   AST 38   ALT 36   ANIONGAP 9   EGFRNONAA 45*     Coagulation:   Recent Labs   Lab 11/09/19 1739   INR 1.0   APTT 24.1     Lactic Acid:   Recent Labs   Lab 11/09/19  1739   LACTATE 1.7     Lipase:   Recent Labs   Lab 11/09/19 1739   LIPASE 20     Magnesium:   Recent Labs   Lab 11/09/19 1739   MG 1.7     Troponin:   Recent Labs   Lab 11/09/19 1739   TROPONINI 0.008     TSH:   Recent Labs   Lab 07/15/19  0802   TSH 0.040*     Urine Studies:   Recent Labs   Lab 11/09/19  1921   COLORU Yellow   APPEARANCEUA Clear   PHUR 6.0   SPECGRAV 1.010   PROTEINUA Negative   GLUCUA Negative   KETONESU Negative   BILIRUBINUA Negative   OCCULTUA Negative   NITRITE  Negative   UROBILINOGEN Negative   LEUKOCYTESUR Negative     All pertinent labs within the past 24 hours have been reviewed.    Significant Imaging: I have reviewed all pertinent imaging results/findings within the past 24 hours.     Imaging Results           X-Ray Chest AP Portable (Final result)  Result time 11/09/19 17:03:57    Final result by Joe Rivas MD (11/09/19 17:03:57)                 Impression:      Findings concerning for left basilar airspace disease including aspiration or pneumonia, with potential trace left parapneumonic pleural effusion.  Short-term follow-up chest radiography after therapy is recommended to resolution.    This report was flagged in Epic as abnormal.      Electronically signed by: Joe Rivsa MD  Date:    11/09/2019  Time:    17:03             Narrative:    EXAMINATION:  XR CHEST AP PORTABLE    CLINICAL HISTORY:  Sepsis;    TECHNIQUE:  Single frontal view of the chest was performed.    COMPARISON:  Chest radiograph 10/15/2019    FINDINGS:  Patient is slightly rotated.  Opacities with air bronchograms projected over the left lung base concerning for airspace disease.  Slight blunting of the left costophrenic angle which may represent pleural thickening/scarring versus small pleural effusion.  No large consolidation or pleural effusion on the right.  No pneumothorax.  Cardiomediastinal silhouette is otherwise stable.  Unchanged bilateral mild nonspecific interstitial coarsening.  No acute osseous process seen.
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
## 2023-02-27 ENCOUNTER — HOSPITAL ENCOUNTER (EMERGENCY)
Facility: HOSPITAL | Age: 43
Discharge: HOME | End: 2023-02-27
Attending: STUDENT IN AN ORGANIZED HEALTH CARE EDUCATION/TRAINING PROGRAM | Admitting: STUDENT IN AN ORGANIZED HEALTH CARE EDUCATION/TRAINING PROGRAM
Payer: COMMERCIAL

## 2023-02-27 ENCOUNTER — APPOINTMENT (OUTPATIENT)
Dept: CT IMAGING | Facility: HOSPITAL | Age: 43
End: 2023-02-27
Payer: COMMERCIAL

## 2023-02-27 VITALS
BODY MASS INDEX: 29.19 KG/M2 | HEART RATE: 99 BPM | RESPIRATION RATE: 16 BRPM | DIASTOLIC BLOOD PRESSURE: 103 MMHG | HEIGHT: 64 IN | SYSTOLIC BLOOD PRESSURE: 144 MMHG | WEIGHT: 171 LBS | TEMPERATURE: 97.2 F | OXYGEN SATURATION: 96 %

## 2023-02-27 DIAGNOSIS — K61.2 ABSCESS OF ANAL AND RECTAL REGIONS: ICD-10-CM

## 2023-02-27 LAB
ALBUMIN SERPL BCG-MCNC: 3.9 G/DL (ref 3.5–5.2)
ALP SERPL-CCNC: 102 U/L (ref 40–129)
ALT SERPL W P-5'-P-CCNC: 18 U/L (ref 10–50)
ANION GAP SERPL CALCULATED.3IONS-SCNC: 15 MMOL/L (ref 7–15)
AST SERPL W P-5'-P-CCNC: 41 U/L (ref 10–50)
BASOPHILS # BLD AUTO: 0.1 10E3/UL (ref 0–0.2)
BASOPHILS NFR BLD AUTO: 1 %
BILIRUB SERPL-MCNC: 0.9 MG/DL
BUN SERPL-MCNC: 13.6 MG/DL (ref 6–20)
CALCIUM SERPL-MCNC: 9.2 MG/DL (ref 8.6–10)
CHLORIDE SERPL-SCNC: 105 MMOL/L (ref 98–107)
CREAT SERPL-MCNC: 0.91 MG/DL (ref 0.67–1.17)
DEPRECATED HCO3 PLAS-SCNC: 20 MMOL/L (ref 22–29)
EOSINOPHIL # BLD AUTO: 0.4 10E3/UL (ref 0–0.7)
EOSINOPHIL NFR BLD AUTO: 2 %
ERYTHROCYTE [DISTWIDTH] IN BLOOD BY AUTOMATED COUNT: 12.3 % (ref 10–15)
GFR SERPL CREATININE-BSD FRML MDRD: >90 ML/MIN/1.73M2
GLUCOSE SERPL-MCNC: 79 MG/DL (ref 70–99)
HCT VFR BLD AUTO: 48.1 % (ref 40–53)
HGB BLD-MCNC: 15.9 G/DL (ref 13.3–17.7)
IMM GRANULOCYTES # BLD: 0.2 10E3/UL
IMM GRANULOCYTES NFR BLD: 1 %
LACTATE SERPL-SCNC: 1.4 MMOL/L (ref 0.7–2)
LYMPHOCYTES # BLD AUTO: 3.7 10E3/UL (ref 0.8–5.3)
LYMPHOCYTES NFR BLD AUTO: 20 %
MCH RBC QN AUTO: 31.4 PG (ref 26.5–33)
MCHC RBC AUTO-ENTMCNC: 33.1 G/DL (ref 31.5–36.5)
MCV RBC AUTO: 95 FL (ref 78–100)
MONOCYTES # BLD AUTO: 1.3 10E3/UL (ref 0–1.3)
MONOCYTES NFR BLD AUTO: 7 %
NEUTROPHILS # BLD AUTO: 13.1 10E3/UL (ref 1.6–8.3)
NEUTROPHILS NFR BLD AUTO: 69 %
NRBC # BLD AUTO: 0 10E3/UL
NRBC BLD AUTO-RTO: 0 /100
PLATELET # BLD AUTO: 357 10E3/UL (ref 150–450)
POTASSIUM SERPL-SCNC: 4.8 MMOL/L (ref 3.4–5.3)
PROT SERPL-MCNC: 8.9 G/DL (ref 6.4–8.3)
RBC # BLD AUTO: 5.07 10E6/UL (ref 4.4–5.9)
SODIUM SERPL-SCNC: 140 MMOL/L (ref 136–145)
WBC # BLD AUTO: 18.6 10E3/UL (ref 4–11)

## 2023-02-27 PROCEDURE — 36415 COLL VENOUS BLD VENIPUNCTURE: CPT

## 2023-02-27 PROCEDURE — 93005 ELECTROCARDIOGRAM TRACING: CPT

## 2023-02-27 PROCEDURE — 80053 COMPREHEN METABOLIC PANEL: CPT

## 2023-02-27 PROCEDURE — 85025 COMPLETE CBC W/AUTO DIFF WBC: CPT

## 2023-02-27 PROCEDURE — 83605 ASSAY OF LACTIC ACID: CPT

## 2023-02-27 PROCEDURE — 46050 I&D PERIANAL ABSCESS SUPFC: CPT

## 2023-02-27 PROCEDURE — 250N000011 HC RX IP 250 OP 636: Performed by: STUDENT IN AN ORGANIZED HEALTH CARE EDUCATION/TRAINING PROGRAM

## 2023-02-27 PROCEDURE — 74177 CT ABD & PELVIS W/CONTRAST: CPT

## 2023-02-27 PROCEDURE — 99285 EMERGENCY DEPT VISIT HI MDM: CPT | Mod: 25

## 2023-02-27 RX ORDER — IOPAMIDOL 755 MG/ML
100 INJECTION, SOLUTION INTRAVASCULAR ONCE
Status: COMPLETED | OUTPATIENT
Start: 2023-02-27 | End: 2023-02-27

## 2023-02-27 RX ORDER — METRONIDAZOLE 500 MG/1
500 TABLET ORAL 2 TIMES DAILY
Qty: 14 TABLET | Refills: 0 | Status: SHIPPED | OUTPATIENT
Start: 2023-02-27 | End: 2023-03-06

## 2023-02-27 RX ORDER — CIPROFLOXACIN 500 MG/1
500 TABLET, FILM COATED ORAL 2 TIMES DAILY
Qty: 6 TABLET | Refills: 0 | Status: SHIPPED | OUTPATIENT
Start: 2023-02-27 | End: 2023-03-02

## 2023-02-27 RX ADMIN — IOPAMIDOL 100 ML: 755 INJECTION, SOLUTION INTRAVENOUS at 21:16

## 2023-02-27 ASSESSMENT — ACTIVITIES OF DAILY LIVING (ADL): ADLS_ACUITY_SCORE: 35

## 2023-02-27 ASSESSMENT — ENCOUNTER SYMPTOMS
HEADACHES: 0
DIZZINESS: 0
VOMITING: 0
NAUSEA: 0
SHORTNESS OF BREATH: 0
COUGH: 0
FEVER: 0
LIGHT-HEADEDNESS: 0
CHILLS: 0

## 2023-02-27 NOTE — ED TRIAGE NOTES
Pt has had a left buttock cyst for a year but since Saturday it has gotten painful and is draining pus.

## 2023-02-27 NOTE — ED PROVIDER NOTES
EMERGENCY DEPARTMENT ENCOUNTER      NAME: Ricki Kilpatrick  AGE: 42 year old male  YOB: 1980  MRN: [id number]  EVALUATION DATE & TIME: No admission date for patient encounter.    PCP: No Ref-Primary, Physician    ED PROVIDER: Pauline Bradley PA-C      Chief Complaint   Patient presents with     Cyst     FINAL IMPRESSION:  1. Abscess of anal and rectal regions        ED COURSE & MEDICAL DECISION MAKING:    Pertinent Labs & Imaging studies reviewed. (See chart for details)  42 year old male presents to the Emergency Department for evaluation of abscess.  Patient has an abscess on his right buttocks for the past year and that has been actively draining. Two days ago patient started to notice increased pain and redness around his abscess.  Vital signs reviewed and patient is hypertensive and tachycardic most likely secondary due to pain.  Tachycardia improved throughout the ED visit.  Afebrile.  On exam there is a 9 inch x 4 inch indurated, erythematous abscess without surrounding warmth.  Patient is tender to palpation around the abscess.  Remainder of exam is unremarkable.    Differential diagnosis includes but not limited to rectal abscess, peritonsillar abscess.  Sepsis was on the differential.  CBC shows white blood count of 18.6.  Lactic acid was 1.4.  CMP was unremarkable.  CT shows soft tissue thickening and stranding in the left medial gluteal fold extending to the perianal region with a possible development of 2.7 cm collection although there is does not appear to be anything drainable at this time.  Incision and drainage was attempted but was unsuccessful. Patient tolerated the procedure well.  Dr. Ca from colorectal was consulted who will see the patient in clinic.  Patient be started on Cipro and Flagyl.  Will take ibuprofen as needed for pain.  Patient declined an ibuprofen prescription.  Patient will follow-up with colorectal. Strict return precautions were given to the patient.  Patient  return to the ED if new symptoms develop or symptoms worsen.  Patient agrees with plan.  All questions answered.      ED COURSE:   4:13 PM I saw the patient.   4:48 PM I rechecked on patient and found a spot in order to do a physical exam.   5:24 PM Discussed patient with Dr. Bragg. He is planning to go see the patient and examine him.   8:55 PM I spoke with the lab about why the orders were delayed. They told me the labs would be out within minutes. I made Dr. Bragg aware of this.   9:46 PM I spoke with Dr. Bragg about the plan for drainage.   10:00 PM I updated patient about plan for procedure.  10:17 PM Attempted to drain pus, but no pus was present. Will refer to colon/rectal and give antibiotics.   10:21 PM I paged colon/rectal.   10:40 PM I spoke with Dr. Ca, colon/rectal who recommenced follow up in clinic.    10:45 PM I spoke with patient about discharge and to follow up with colon/rectal.      Additional Documentation    History:    Supplemental history from: Documented in chart, if applicable    External Record(s) reviewed: Documented in chart, if applicable.    Work Up:    Chart documentation includes differential considered and any EKGs or imaging interpreted by provider.    In additional to work up documented, I considered the following work up: Documented in chart, if applicable.    External consultation:    Discussion of management with another provider: Documented in chart, if applicable    Complicating factors:    Care impacted by chronic illness: N/A    Care affected by social determinants of health: N/A    Disposition considerations: Discharge. I prescribed additional prescription strength medication(s) as charted. See documentation for any additional details.      MEDICATIONS GIVEN IN THE EMERGENCY:  Medications   iopamidol (ISOVUE-370) solution 100 mL (100 mLs Intravenous $Given 2/27/23 2116)       NEW PRESCRIPTIONS STARTED AT TODAY'S ER VISIT  Discharge Medication List as of 2/27/2023 11:16 PM       START taking these medications    Details   ciprofloxacin (CIPRO) 500 MG tablet Take 1 tablet (500 mg) by mouth 2 times daily for 3 days, Disp-6 tablet, R-0, Local Print      metroNIDAZOLE (FLAGYL) 500 MG tablet Take 1 tablet (500 mg) by mouth 2 times daily for 7 days, Disp-14 tablet, R-0, Local PrintEat yogurt or cottage cheese daily to prevent diarrhea that can be caused by taking this medication.                =================================================================    HPI    Patient information was obtained from: Patient    Use of : N/A        Ricki Kilpatrick is a 42 year old male with a pertinent history of SAH who presents to this ED by private car with  for evaluation of a cyst.    Two days ago, patient reports that the cyst that has been present for the past year got much larger. He notes the cyst is on his left buttock, that goes from the crack to the outer buttock. He reports there is drainage, including a white liquid and blood which he drains with his mother everyday. He also mentions some surrounding redness. He is unsure if it is warm to the touch. He notes that he decided to come in today because as of two days ago, he reports that the pain has been shooting down into his left leg and down into his left foot. He does note that he is able to sleep. He mentions that he waited to come for so long because he thought it would eventually heal. Denies any other health problems or being on any medications. Denies any fever, chills, headache, dizziness, nausea, vomiting, chest pain, shortness of breath or lightheadedness. Otherwise has been in normal state of health. No further concerns at this time.       REVIEW OF SYSTEMS   Review of Systems   Constitutional: Negative for chills and fever.   Respiratory: Negative for cough and shortness of breath.    Cardiovascular: Negative for chest pain.   Gastrointestinal: Negative for nausea and vomiting.   Musculoskeletal:        Positive for  "pain radiating down into the left foot and leg.    Skin:        Positive for cyst on left buttock.   Neurological: Negative for dizziness, light-headedness and headaches.   All other systems reviewed and are negative.      PAST MEDICAL HISTORY:  No past medical history on file.    PAST SURGICAL HISTORY:  No past surgical history on file.    CURRENT MEDICATIONS:    ciprofloxacin (CIPRO) 500 MG tablet  metroNIDAZOLE (FLAGYL) 500 MG tablet  chlorthalidone (HYGROTON) 25 MG tablet  levETIRAcetam (KEPPRA) 750 MG tablet  methocarbamol (ROBAXIN) 750 MG tablet        ALLERGIES:  No Known Allergies    FAMILY HISTORY:  No family history on file.    SOCIAL HISTORY:   Social History     Socioeconomic History     Marital status: Single   Tobacco Use     Smoking status: Never     Smokeless tobacco: Never   Substance and Sexual Activity     Alcohol use: Yes     Drug use: Never       VITALS:  BP (!) 144/103   Pulse 99   Temp 97.2  F (36.2  C) (Tympanic)   Resp 16   Ht 1.626 m (5' 4\")   Wt 77.6 kg (171 lb)   SpO2 96%   BMI 29.35 kg/m      PHYSICAL EXAM    Physical Exam  Vitals and nursing note reviewed.   Constitutional:       General: He is not in acute distress.     Appearance: Normal appearance. He is not ill-appearing.   HENT:      Head: Atraumatic.      Right Ear: External ear normal.      Left Ear: External ear normal.      Nose: Nose normal.      Mouth/Throat:      Mouth: Mucous membranes are moist.   Eyes:      Conjunctiva/sclera: Conjunctivae normal.      Pupils: Pupils are equal, round, and reactive to light.   Cardiovascular:      Rate and Rhythm: Normal rate and regular rhythm.      Pulses: Normal pulses.      Heart sounds: Normal heart sounds. No murmur heard.    No friction rub. No gallop.   Pulmonary:      Effort: Pulmonary effort is normal.      Breath sounds: Normal breath sounds. No wheezing or rales.   Abdominal:      Tenderness: There is no abdominal tenderness. There is no guarding or rebound. "   Musculoskeletal:      Cervical back: Normal range of motion.   Skin:     General: Skin is dry.      Comments: Raised 9 inch x 5 inch indurated area that is erythematous. No warmth. No active drainage from the area. No ecchymosis.  No rash.   Neurological:      Mental Status: He is alert. Mental status is at baseline.   Psychiatric:         Mood and Affect: Mood normal.         Thought Content: Thought content normal.          LAB:  All pertinent labs reviewed and interpreted.  Labs Ordered and Resulted from Time of ED Arrival to Time of ED Departure   COMPREHENSIVE METABOLIC PANEL - Abnormal       Result Value    Sodium 140      Potassium 4.8      Chloride 105      Carbon Dioxide (CO2) 20 (*)     Anion Gap 15      Urea Nitrogen 13.6      Creatinine 0.91      Calcium 9.2      Glucose 79      Alkaline Phosphatase 102      AST 41      ALT 18      Protein Total 8.9 (*)     Albumin 3.9      Bilirubin Total 0.9      GFR Estimate >90     CBC WITH PLATELETS AND DIFFERENTIAL - Abnormal    WBC Count 18.6 (*)     RBC Count 5.07      Hemoglobin 15.9      Hematocrit 48.1      MCV 95      MCH 31.4      MCHC 33.1      RDW 12.3      Platelet Count 357      % Neutrophils 69      % Lymphocytes 20      % Monocytes 7      % Eosinophils 2      % Basophils 1      % Immature Granulocytes 1      NRBCs per 100 WBC 0      Absolute Neutrophils 13.1 (*)     Absolute Lymphocytes 3.7      Absolute Monocytes 1.3      Absolute Eosinophils 0.4      Absolute Basophils 0.1      Absolute Immature Granulocytes 0.2      Absolute NRBCs 0.0     LACTIC ACID WHOLE BLOOD - Normal    Lactic Acid 1.4          RADIOLOGY:  Reviewed all pertinent imaging. Please see official radiology report.  CT Abdomen Pelvis w Contrast   Final Result   IMPRESSION: Significantly limited exam due to patient motion artifact.   1.  Soft tissue thickening and stranding in the left medial gluteal fold extending to the perianal region with a possible developing 2.7 cm collection,  although there does not appear to be anything drainable at this time. Please correlate for perianal    fistula. If this is a clinical concern, an MRI can be considered.          EKG:    Performed at: 18:52    Impression: Normal sinus rhythm  Normal ECG    Rate: 99 bpm  Rhythm: Sinus  Axis: 77  FL Interval: 162 ms  QRS Interval: 96 ms  QTc Interval: 410ms  ST Changes: N/A  Comparison: N/A    Signed by Dr. Bragg.     PROCEDURE:  PROCEDURE: Incision and Drainage   INDICATIONS: Localized abscess   PROCEDURE PROVIDER: Pauline Bui PA-C   SITE: Right buttok   MEDICATION: 5 mLs of 1% Lidocaine with epinephrine   NOTE: The area was prepped with chlorhexidine and draped off in the usual sterile fashion.  Local anesthetic was injected subcutaneously with anesthesia effects demonstrated prior to proceeding.  The area of maximal fluctuance was opened with a 18 gauge needle to allow for drainage.  The abscess was drained.  The abscess cavity was bluntly explored to separate any loculations. No Packing was placed into the abscess cavity.  A sterile dressing was placed over the area.   COMPLEXITY: Complex    Simple = single, furuncle, paronychia, superficial  Complex = multiple or abscess requiring probing, loculations, packing placement   COMPLICATIONS: Patient tolerated procedure well, without complication         I, Jasmin Small, am serving as a scribe to document services personally performed by Pauline Bradley PA-C, based on my observation and the provider's statements to me. I, Pauline Bradley PA-C, attest that Jasmin Small is acting in a scribe capacity, has observed my performance of the services and has documented them in accordance with my direction.    Pauline Bradley PA-C  [facility] EMERGENCY DEPARTMENT  [address] [place] [id number]  [phone]       Pauline Bradley PA-C  02/28/23 0009

## 2023-02-27 NOTE — ED PROVIDER NOTES
Emergency Department Midlevel Supervisory Note     I personally saw the patient and performed a substantive portion of the visit including all aspects of the medical decision making.    ED Course:  5:20 PM Pauline Bradley PA-C staffed patient with me. I agree with their assessment and plan of management, and I will see the patient.  6:00 PM I met with the patient to introduce myself, gather additional history, perform my initial exam, and discuss the plan.     Brief HPI:     Ricki Kilpatrick is a 42 year old male with a pertinent history of SAH who presents to this ED by private car with  for evaluation of a cyst.     Per [facility] note, two days ago, patient reports that the cyst that has been present for the past year got much larger. He notes the cyst is on his left buttock, that goes from the crack to the outer buttock. He reports there is drainage, including a white liquid and blood which he drains with his mother everyday. He also mentions some surrounding redness. He is unsure if it is warm to the touch. He notes that he decided to come in today because as of two days ago, he reports that the pain has been shooting down into his left leg and down into his left foot. He does note that he is able to sleep. He mentions that he waited to come for so long because he thought it would eventually heal. Denies any other health problems or being on any medications. Denies any fever, chills, headache, dizziness, nausea, vomiting, chest pain, shortness of breath or lightheadedness. Otherwise has been in normal state of health. No further concerns at this time.           I, Charlene Medellin, am serving as a scribe to document services personally performed by Dr. Dilip Bragg based on my observation and the provider's statements to me. I, Dilip Bragg MD attest that Charlene Medellin is acting in a scribe capacity, has observed my performance of the services and has documented them in accordance with my  "direction..    Brief Physical Exam: BP (!) 139/98   Pulse (!) 125   Temp 97.2  F (36.2  C) (Tympanic)   Resp 16   Ht 1.626 m (5' 4\")   Wt 77.6 kg (171 lb)   SpO2 95%   BMI 29.35 kg/m    Constitutional:  Alert, in no acute distress  EYES: Conjunctivae clear  HENT:  Atraumatic, normocephalic  Respiratory:  Respirations even, unlabored, in no acute respiratory distress  Cardiovascular:  Regular rate and rhythm, good peripheral perfusion  GI: Soft, nondistended, nontender, no palpable masses, no rebound, no guarding   Musculoskeletal:  No edema. No cyanosis. Range of motion major extremities intact.    Integument: Warm, Dry, No erythema, No rash.   Neurologic:  Alert & oriented, no focal deficits noted  Psych: Normal mood and affect     MDM:  Attempted to drain abcess in the ED unsuccessfully.  Will call colorectal for expedited follow up.  Please see PA's note       Perirectal Abscess  Labs and Imaging:     I have reviewed the relevant laboratory and radiology studies    Procedures:  I was present for the key portions of this procedure:    Dilip Bragg MD  [facility] EMERGENCY DEPARTMENT  [address] [facility] [id number] [phone]       Dilip Bragg MD  04/28/23 1005    "

## 2023-02-28 NOTE — DISCHARGE INSTRUCTIONS
Rest. Drink water.  Keep abscess clean and dry.  Take Flagyl and cipro as prescribed.  Take Tylenol as needed for your pain.  Follow-up with your primary care doctor to discuss your ED visit.  Follow-up with Dr. Ca from colorectal surgery to discuss your abscess.  Return to the ED if new symptoms develop or symptoms worsen.

## 2023-03-06 ENCOUNTER — TELEPHONE (OUTPATIENT)
Dept: BEHAVIORAL HEALTH | Facility: CLINIC | Age: 43
End: 2023-03-06
Payer: COMMERCIAL

## 2023-03-06 NOTE — TELEPHONE ENCOUNTER
Pt is a(n) adult (18+ out of HS) Seeking as eval for Adult DORIS Assessment required for court. and is interested in Adult Evaluation only - no specific program requested.  Appointment scheduled by:  Patient.  (If patient is self-pay, we much complete a Cost Estimate and save it to the pt chart)  Caller name:  Ricki    Caller phone #: [phone]  If in person, please state reason why:  NA  Brief reason for appt:  Pt need eval for     Cost estimate Did not get completed.  Contact information verified/updated: Yes

## 2023-03-10 ENCOUNTER — HOSPITAL ENCOUNTER (OUTPATIENT)
Dept: BEHAVIORAL HEALTH | Facility: CLINIC | Age: 43
Discharge: HOME | End: 2023-03-10
Attending: FAMILY MEDICINE | Admitting: FAMILY MEDICINE
Payer: COMMERCIAL

## 2023-03-10 VITALS — HEIGHT: 64 IN | WEIGHT: 165 LBS | BODY MASS INDEX: 28.17 KG/M2

## 2023-03-10 DIAGNOSIS — F10.21 ALCOHOL USE DISORDER, SEVERE, IN SUSTAINED REMISSION (H): ICD-10-CM

## 2023-03-10 PROCEDURE — H0001 ALCOHOL AND/OR DRUG ASSESS: HCPCS | Mod: GT

## 2023-03-10 ASSESSMENT — COLUMBIA-SUICIDE SEVERITY RATING SCALE - C-SSRS
TOTAL  NUMBER OF ABORTED OR SELF INTERRUPTED ATTEMPTS LIFETIME: NO
ATTEMPT LIFETIME: NO
TOTAL  NUMBER OF INTERRUPTED ATTEMPTS LIFETIME: NO
2. HAVE YOU ACTUALLY HAD ANY THOUGHTS OF KILLING YOURSELF?: NO
1. HAVE YOU WISHED YOU WERE DEAD OR WISHED YOU COULD GO TO SLEEP AND NOT WAKE UP?: NO
6. HAVE YOU EVER DONE ANYTHING, STARTED TO DO ANYTHING, OR PREPARED TO DO ANYTHING TO END YOUR LIFE?: NO

## 2023-03-10 ASSESSMENT — PAIN SCALES - GENERAL: PAINLEVEL: NO PAIN (0)
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
## 2023-04-19 ENCOUNTER — ANESTHESIA EVENT (OUTPATIENT)
Dept: SURGERY | Facility: AMBULATORY SURGERY CENTER | Age: 43
End: 2023-04-19
Payer: COMMERCIAL

## 2023-04-20 ENCOUNTER — ANESTHESIA (OUTPATIENT)
Dept: SURGERY | Facility: AMBULATORY SURGERY CENTER | Age: 43
End: 2023-04-20
Payer: COMMERCIAL

## 2023-04-20 ENCOUNTER — HOSPITAL ENCOUNTER (OUTPATIENT)
Facility: AMBULATORY SURGERY CENTER | Age: 43
Discharge: HOME | End: 2023-04-20
Attending: COLON & RECTAL SURGERY
Payer: COMMERCIAL

## 2023-04-20 VITALS
HEART RATE: 80 BPM | BODY MASS INDEX: 29.88 KG/M2 | RESPIRATION RATE: 16 BRPM | OXYGEN SATURATION: 96 % | DIASTOLIC BLOOD PRESSURE: 81 MMHG | TEMPERATURE: 97 F | WEIGHT: 175 LBS | SYSTOLIC BLOOD PRESSURE: 136 MMHG | HEIGHT: 64 IN

## 2023-04-20 DIAGNOSIS — K60.30 ANAL FISTULA: ICD-10-CM

## 2023-04-20 DIAGNOSIS — L73.2 HIDRADENITIS: Primary | ICD-10-CM

## 2023-04-20 RX ORDER — OXYCODONE HYDROCHLORIDE 10 MG/1
10 TABLET ORAL
Status: DISCONTINUED | OUTPATIENT
Start: 2023-04-20 | End: 2023-04-21 | Stop reason: HOSPADM

## 2023-04-20 RX ORDER — SODIUM CHLORIDE, SODIUM LACTATE, POTASSIUM CHLORIDE, CALCIUM CHLORIDE 600; 310; 30; 20 MG/100ML; MG/100ML; MG/100ML; MG/100ML
INJECTION, SOLUTION INTRAVENOUS CONTINUOUS
Status: DISCONTINUED | OUTPATIENT
Start: 2023-04-20 | End: 2023-04-21 | Stop reason: HOSPADM

## 2023-04-20 RX ORDER — NEOSTIGMINE METHYLSULFATE 1 MG/ML
VIAL (ML) INJECTION PRN
Status: DISCONTINUED | OUTPATIENT
Start: 2023-04-20 | End: 2023-04-20

## 2023-04-20 RX ORDER — OXYCODONE HYDROCHLORIDE 5 MG/1
5 TABLET ORAL
Status: DISCONTINUED | OUTPATIENT
Start: 2023-04-20 | End: 2023-04-21 | Stop reason: HOSPADM

## 2023-04-20 RX ORDER — ONDANSETRON 2 MG/ML
4 INJECTION INTRAMUSCULAR; INTRAVENOUS EVERY 30 MIN PRN
Status: DISCONTINUED | OUTPATIENT
Start: 2023-04-20 | End: 2023-04-21 | Stop reason: HOSPADM

## 2023-04-20 RX ORDER — OXYCODONE HYDROCHLORIDE 5 MG/1
5-10 TABLET ORAL EVERY 4 HOURS PRN
Qty: 12 TABLET | Refills: 0 | Status: SHIPPED | OUTPATIENT
Start: 2023-04-20

## 2023-04-20 RX ORDER — ONDANSETRON 4 MG/1
4 TABLET, ORALLY DISINTEGRATING ORAL
Status: DISCONTINUED | OUTPATIENT
Start: 2023-04-20 | End: 2023-04-21 | Stop reason: HOSPADM

## 2023-04-20 RX ORDER — HYDROMORPHONE HCL IN WATER/PF 6 MG/30 ML
0.4 PATIENT CONTROLLED ANALGESIA SYRINGE INTRAVENOUS EVERY 5 MIN PRN
Status: DISCONTINUED | OUTPATIENT
Start: 2023-04-20 | End: 2023-04-21 | Stop reason: HOSPADM

## 2023-04-20 RX ORDER — LABETALOL 20 MG/4 ML (5 MG/ML) INTRAVENOUS SYRINGE
PRN
Status: DISCONTINUED | OUTPATIENT
Start: 2023-04-20 | End: 2023-04-20

## 2023-04-20 RX ORDER — FENTANYL CITRATE 0.05 MG/ML
50 INJECTION, SOLUTION INTRAMUSCULAR; INTRAVENOUS EVERY 5 MIN PRN
Status: DISCONTINUED | OUTPATIENT
Start: 2023-04-20 | End: 2023-04-21 | Stop reason: HOSPADM

## 2023-04-20 RX ORDER — DEXAMETHASONE SODIUM PHOSPHATE 4 MG/ML
INJECTION, SOLUTION INTRA-ARTICULAR; INTRALESIONAL; INTRAMUSCULAR; INTRAVENOUS; SOFT TISSUE PRN
Status: DISCONTINUED | OUTPATIENT
Start: 2023-04-20 | End: 2023-04-20

## 2023-04-20 RX ORDER — ACETAMINOPHEN 325 MG/1
650 TABLET ORAL EVERY 4 HOURS PRN
Qty: 100 TABLET | Refills: 0 | COMMUNITY
Start: 2023-04-20 | End: 2023-05-04

## 2023-04-20 RX ORDER — PROPOFOL 10 MG/ML
INJECTION, EMULSION INTRAVENOUS PRN
Status: DISCONTINUED | OUTPATIENT
Start: 2023-04-20 | End: 2023-04-20

## 2023-04-20 RX ORDER — FENTANYL CITRATE 50 UG/ML
INJECTION, SOLUTION INTRAMUSCULAR; INTRAVENOUS PRN
Status: DISCONTINUED | OUTPATIENT
Start: 2023-04-20 | End: 2023-04-20

## 2023-04-20 RX ORDER — ONDANSETRON 2 MG/ML
INJECTION INTRAMUSCULAR; INTRAVENOUS PRN
Status: DISCONTINUED | OUTPATIENT
Start: 2023-04-20 | End: 2023-04-20

## 2023-04-20 RX ORDER — ONDANSETRON 4 MG/1
4 TABLET, ORALLY DISINTEGRATING ORAL EVERY 30 MIN PRN
Status: DISCONTINUED | OUTPATIENT
Start: 2023-04-20 | End: 2023-04-21 | Stop reason: HOSPADM

## 2023-04-20 RX ORDER — ACETAMINOPHEN 160 MG
TABLET,DISINTEGRATING ORAL PRN
Status: DISCONTINUED | OUTPATIENT
Start: 2023-04-20 | End: 2023-04-20 | Stop reason: HOSPADM

## 2023-04-20 RX ORDER — LIDOCAINE HYDROCHLORIDE 20 MG/ML
INJECTION, SOLUTION INFILTRATION; PERINEURAL PRN
Status: DISCONTINUED | OUTPATIENT
Start: 2023-04-20 | End: 2023-04-20

## 2023-04-20 RX ORDER — LIDOCAINE 40 MG/G
CREAM TOPICAL
Status: DISCONTINUED | OUTPATIENT
Start: 2023-04-20 | End: 2023-04-21 | Stop reason: HOSPADM

## 2023-04-20 RX ORDER — ESMOLOL HYDROCHLORIDE 10 MG/ML
INJECTION INTRAVENOUS PRN
Status: DISCONTINUED | OUTPATIENT
Start: 2023-04-20 | End: 2023-04-20

## 2023-04-20 RX ORDER — ACETAMINOPHEN 325 MG/1
650 TABLET ORAL
Status: DISCONTINUED | OUTPATIENT
Start: 2023-04-20 | End: 2023-04-21 | Stop reason: HOSPADM

## 2023-04-20 RX ORDER — HYDROMORPHONE HCL IN WATER/PF 6 MG/30 ML
0.2 PATIENT CONTROLLED ANALGESIA SYRINGE INTRAVENOUS EVERY 5 MIN PRN
Status: DISCONTINUED | OUTPATIENT
Start: 2023-04-20 | End: 2023-04-21 | Stop reason: HOSPADM

## 2023-04-20 RX ORDER — ACETAMINOPHEN 325 MG/1
975 TABLET ORAL ONCE
Status: COMPLETED | OUTPATIENT
Start: 2023-04-20 | End: 2023-04-20

## 2023-04-20 RX ORDER — GLYCOPYRROLATE 0.2 MG/ML
INJECTION, SOLUTION INTRAMUSCULAR; INTRAVENOUS PRN
Status: DISCONTINUED | OUTPATIENT
Start: 2023-04-20 | End: 2023-04-20

## 2023-04-20 RX ORDER — IBUPROFEN 800 MG/1
800 TABLET, FILM COATED ORAL EVERY 8 HOURS PRN
Qty: 42 TABLET | Refills: 0 | COMMUNITY
Start: 2023-04-20 | End: 2023-05-04

## 2023-04-20 RX ORDER — FENTANYL CITRATE 0.05 MG/ML
25 INJECTION, SOLUTION INTRAMUSCULAR; INTRAVENOUS
Status: DISCONTINUED | OUTPATIENT
Start: 2023-04-20 | End: 2023-04-21 | Stop reason: HOSPADM

## 2023-04-20 RX ORDER — FENTANYL CITRATE 0.05 MG/ML
25 INJECTION, SOLUTION INTRAMUSCULAR; INTRAVENOUS EVERY 5 MIN PRN
Status: DISCONTINUED | OUTPATIENT
Start: 2023-04-20 | End: 2023-04-21 | Stop reason: HOSPADM

## 2023-04-20 RX ADMIN — FENTANYL CITRATE 100 MCG: 50 INJECTION, SOLUTION INTRAMUSCULAR; INTRAVENOUS at 08:54

## 2023-04-20 RX ADMIN — PROPOFOL 200 MG: 10 INJECTION, EMULSION INTRAVENOUS at 08:54

## 2023-04-20 RX ADMIN — DEXAMETHASONE SODIUM PHOSPHATE 8 MG: 4 INJECTION, SOLUTION INTRA-ARTICULAR; INTRALESIONAL; INTRAMUSCULAR; INTRAVENOUS; SOFT TISSUE at 09:06

## 2023-04-20 RX ADMIN — ESMOLOL HYDROCHLORIDE 20 MG: 10 INJECTION INTRAVENOUS at 09:07

## 2023-04-20 RX ADMIN — GLYCOPYRROLATE 0.8 MG: 0.2 INJECTION, SOLUTION INTRAMUSCULAR; INTRAVENOUS at 09:37

## 2023-04-20 RX ADMIN — GLYCOPYRROLATE 0.2 MG: 0.2 INJECTION, SOLUTION INTRAMUSCULAR; INTRAVENOUS at 08:54

## 2023-04-20 RX ADMIN — PROPOFOL 30 MG: 10 INJECTION, EMULSION INTRAVENOUS at 09:17

## 2023-04-20 RX ADMIN — LIDOCAINE HYDROCHLORIDE 3 ML: 20 INJECTION, SOLUTION INFILTRATION; PERINEURAL at 08:54

## 2023-04-20 RX ADMIN — ONDANSETRON 4 MG: 2 INJECTION INTRAMUSCULAR; INTRAVENOUS at 09:15

## 2023-04-20 RX ADMIN — PROPOFOL 40 MG: 10 INJECTION, EMULSION INTRAVENOUS at 09:09

## 2023-04-20 RX ADMIN — SODIUM CHLORIDE, SODIUM LACTATE, POTASSIUM CHLORIDE, CALCIUM CHLORIDE: 600; 310; 30; 20 INJECTION, SOLUTION INTRAVENOUS at 07:53

## 2023-04-20 RX ADMIN — Medication 40 MG: at 08:54

## 2023-04-20 RX ADMIN — ACETAMINOPHEN 975 MG: 325 TABLET ORAL at 07:47

## 2023-04-20 RX ADMIN — LABETALOL 20 MG/4 ML (5 MG/ML) INTRAVENOUS SYRINGE 10 MG: at 09:13

## 2023-04-20 RX ADMIN — Medication 4 MG: at 09:37

## 2023-04-20 ASSESSMENT — LIFESTYLE VARIABLES: TOBACCO_USE: 1

## 2023-04-20 NOTE — DISCHARGE INSTRUCTIONS
Patient Discharge Instructions:    You have just undergone anorectal surgery.  Here are a few things to expect after your surgery:    1) It is common to have pain after surgery.  We try to focus on non-narcotic medications when possible, although sometimes we do give a narcotic prescription for pain that cannot be controlled with Tylenol and Ibuprofen. Take the medications as follows:  Tylenol: 650mg every 4 hours for 48 hours. Then every 4 hour as needed for pain. Do not take the Tylenol if you have been otherwise directed by a doctor not to take it (for liver problems, etc).  Ibuprofen: 800mg every 8 hours for 48 hours WITH FOOD. Then every 8 hours as needed for pain. Don't take NSAIDs if you have Crohn's, Ulcerative Colitis, kidney problems, or have been otherwise instructed not to take them.   You should offset these medications (e.g. Tylenol at 12:00PM, Ibuprofen at 2pm, Tylenol at 4:00PM, Tylenol at 8:00PM, Ibuprofen at 10:00PM).  If you have Oxycodone, you can take it at any time as long as you space it out every 4 hours.   You have received 975 mg of Acetaminophen (Tylenol) at 7:47am. Please do not take an additional dose of Tylenol until after 1:47pm.    2) You may have some spotting or mild amounts of bleeding/bloody drainage.  If you see more significant bleeding, try holding some pressure over the wound for 15-20 minutes.   If you pass more than a cup full of blood or you cannot get the bleeding to stop by holding pressure, call the office.    3) Infections in this area are rare, but can be serious.  If you see small amounts of yellowish/pus like drainage in the days following surgery, this is expected. However, if you have increasing pain, increasing drainage, fevers, or an inability to urinate (can't pee), call the office or go the ER.     4) You will feel numb in the anorectal area for 4-6 hours after surgery due to the numbing medication used in the operating room.  It is possible you may experience  some fecal incontinence (accidental passage of gas/stool) during this time.  The numbness will resolve on its own.  Once you feel sensation returning, you should consider taking something for pain as you can expect oral medications to take 30-60 minutes before you start feeling their effects.    5) Wound care instructions: There is a wound on your left buttock.  It is approximately 4 inches long and 3 inches wide.  You should cover this wound with a lightly moist gauze and change it twice daily or whenever it is dirty or after you go to the bathroom.  This wound will continue to slowly fill in on its own until it is completely healed.    6) For some operations you may have stitches in your wound.  Those stitches almost always break within a few days of surgery.  If you feel a pop or the wound opens - this is OK.  The wound will continue to fill in on its own.  It is still ok to shower/bathe as normal.  Expect some amount of drainage if the wound is open.    7) Avoiding constipation after surgery is very important. Start by taking Psyllium Husk (e.g. Metamucil or Konsyl) 1-2 tablespoons in a large glass of water daily. In addition to this, it is very important to drink at least 64 ounces of water daily. If you get constipated, it will be much more uncomfortable when you do have a bowel movement. If you do not have a BM in 2 days, try one of the following medications (over the counter) listed below:   Milk of Magnesia 30 mL every 8 hours until BM  Miralax 1-2 capfuls daily until BM (this may take 1-2 days)  Senna 1-2 tabs twice a day as necessary    8) Your only activity restrictions are no driving while you are taking narcotics.  You may want to avoid heavy lifting/strenuous exercise for the first 1-2 days after surgery, but if you feel up to resuming normal activities/exercise, this is ok.    9) Lastly, if you have any questions or concerns - PLEASE CALL ([phone])!  Our office has someone on call 24 hours a  day.  If your question is one that can wait until normal business hours (Mon-Fri 8:30AM-5PM), it is better to wait until the daytime as someone more familiar with your care will be able to help you.  However, if it is an emergency, the on-call surgeon will be able to give you good advice.    Mega Isbell MD, ELIEL  [facility]  Office: [phone]  4/20/2023 8:32 AM    Discharge Instructions: After Your Surgery    You ve just had surgery. During surgery, you were given medicine called anesthesia to keep you relaxed and free of pain. After surgery, you may have some pain or nausea. This is common. Here are some tips for feeling better and getting well after surgery.    Going home    Your healthcare provider will show you how to take care of yourself when you go home. He or she will also answer your questions. Have an adult family member or friend drive you home. For the first 24 hours after your surgery:  Don't drive or use heavy equipment.  Don't make important decisions or sign legal papers.  Don't drink alcohol.  Have an adult stay with you for 24 hours. He or she can watch for problems and help keep you safe.  Be sure to go to all follow-up visits with your healthcare provider. And rest after your surgery for as long as your healthcare provider tells you to.    Coping with pain    If you have pain after surgery, pain medicine will help you feel better. Take it as told, before pain becomes severe. Also, ask your healthcare provider or pharmacist about other ways to control pain. This might be with heat, ice, or relaxation. And follow any other instructions your surgeon or nurse gives you.    Tips for taking pain medicine    To get the best relief possible, remember these points:  Pain medicines can upset your stomach. Taking them with a little food may help.  Most pain relievers taken by mouth need at least 20 to 30 minutes to start to work.  Don't wait till your pain becomes severe before  you take your medicine. Try to time your medicine so that you can take it before starting an activity. This might be before you get dressed, go for a walk, or sit down for dinner.  Constipation is a common side effect of pain medicines. It's ok to take medicines such as laxatives or stool softeners to help ease constipation. Also ask if you should skip any foods. Drinking lots of fluids and eating foods such as fruits and vegetables that are high in fiber can also help.  Drinking alcohol and taking pain medicine can cause dizziness and slow your breathing. It can even be deadly. Don't drink alcohol while taking pain medicine.  Pain medicine can make you react more slowly to things. Don't drive or run machinery while taking pain medicine.  Your healthcare provider may tell you to take acetaminophen to help ease your pain. Ask him or her how much you are supposed to take each day. Acetaminophen or other pain relievers may interact with your prescription medicines or other over-the-counter (OTC) medicines. Some prescription medicines have acetaminophen and other ingredients. Using both prescription and OTC acetaminophen for pain can cause you to overdose. Read the labels on your OTC medicines with care. This will help you to clearly know the list of ingredients, how much to take, and any warnings. It may also help you not take too much acetaminophen. If you have questions or don't understand the information, ask your pharmacist or healthcare provider to explain it to you before you take the OTC medicine.    Managing nausea    Some people have an upset stomach after surgery. This is often because of anesthesia, pain, or pain medicine, or the stress of surgery. These tips will help you handle nausea and eat healthy foods as you get better. If you were on a special food plan before surgery, ask your healthcare provider if you should follow it while you get better. These tips may help:    Don't push yourself to eat. Your  body will tell you when to eat and how much.  Start off with clear liquids and soup. They are easier to digest.  Next try semi-solid foods, such as mashed potatoes, applesauce, and gelatin, as you feel ready.  Slowly move to solid foods. Don t eat fatty, rich, or spicy foods at first.  Don't force yourself to have 3 large meals a day. Instead eat smaller amounts more often.  Take pain medicines with a small amount of solid food, such as crackers or toast, to prevent nausea.    When to call your healthcare provider    Call your healthcare provider if:  You still have intolerable pain an hour after taking medicine. The medicine may not be strong enough.  You feel too sleepy, dizzy, or groggy. The medicine may be too strong.  You have side effects such as nausea or vomiting, or skin changes such as rash, itching, or hives. Your healthcare provider may suggest other medicines to control side effects.  Rash, itching, or hives may mean you have an allergic reaction. Report this right away. If you have trouble breathing or facial swelling, call 911 right away.    If you have obstructive sleep apnea    You were given anesthesia medicine during surgery to keep you comfortable and free of pain. After surgery, you may have more apnea spells because of this medicine and other medicines you were given. The spells may last longer than usual.   At home:  Keep using the continuous positive airway pressure (CPAP) device when you sleep. Unless your healthcare provider tells you not to, use it when you sleep, day or night. CPAP is a common device used to treat obstructive sleep apnea.  Talk with your provider before taking any pain medicine, muscle relaxants, or sedatives. Your provider will tell you about the possible dangers of taking these medicines.

## 2023-04-20 NOTE — ANESTHESIA PROCEDURE NOTES
Airway       Patient location during procedure: OR       Procedure Start/Stop Times: 4/20/2023 8:56 AM  Staff -        CRNA: Lucia Hernandez APRN CRNA       Performed By: CRNA  Consent for Airway        Urgency: elective  Indications and Patient Condition       Indications for airway management: rai-procedural         Mask difficulty assessment: 1 - vent by mask    Final Airway Details       Final airway type: endotracheal airway       Successful airway: ETT - single  Endotracheal Airway Details        ETT size (mm): 7.0       Cuffed: yes       Successful intubation technique: direct laryngoscopy       DL Blade Type: MAC 4       Grade View of Cords: 1       Adjucts: stylet       Measured from: lips       Secured at (cm): 22    Post intubation assessment        Placement verified by: capnometry, equal breath sounds and chest rise        Number of attempts at approach: 1       Number of other approaches attempted: 0       Secured with: silk tape       Ease of procedure: easy       Dentition: Intact and Unchanged       Dental guard used and removed. Dental Guard Type: Proguard Red.    Medication(s) Administered   Medication Administration Time: 4/20/2023 8:56 AM

## 2023-04-20 NOTE — ANESTHESIA CARE TRANSFER NOTE
Patient: Ricki Kilpatrick    Procedure: Procedure(s):  EXAM UNDER ANESTHESIA DEBRIDEMENT OF CHRONIC PERIANAL ABESS CAVITY, EXCISION OF HYDROADENITIS         Diagnosis: Anal fistula [K60.3]  Diagnosis Additional Information: No value filed.    Anesthesia Type:   General     Note:    Oropharynx: oropharynx clear of all foreign objects and spontaneously breathing  Level of Consciousness: drowsy  Oxygen Supplementation: face mask  Level of Supplemental Oxygen (L/min / FiO2): 87  Independent Airway: airway patency satisfactory and stable  Dentition: dentition unchanged  Vital Signs Stable: post-procedure vital signs reviewed and stable  Report to RN Given: handoff report given  Patient transferred to: Phase II    Handoff Report: Identifed the Patient, Identified the Reponsible Provider, Reviewed the pertinent medical history, Discussed the surgical course, Reviewed Intra-OP anesthesia mangement and issues during anesthesia, Set expectations for post-procedure period and Allowed opportunity for questions and acknowledgement of understanding      Vitals:  Vitals Value Taken Time   /97 04/20/23 0943   Temp 97  F (36.1  C) 04/20/23 0943   Pulse 103 04/20/23 0944   Resp 16 04/20/23 0943   SpO2 96 % 04/20/23 0944   Vitals shown include unvalidated device data.    Electronically Signed By: ALIS Brown CRNA  April 20, 2023  9:46 AM
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
## 2023-04-20 NOTE — INTERVAL H&P NOTE
"I have reviewed the surgical (or preoperative) H&P that is linked to this encounter, and examined the patient. There are no significant changes    Clinical Conditions Present on Arrival:  Clinically Significant Risk Factors Present on Admission                  # Obesity: Estimated body mass index is 30.04 kg/m  as calculated from the following:    Height as of this encounter: 1.626 m (5' 4\").    Weight as of this encounter: 79.4 kg (175 lb).     Dx: Anal fistula    Plan: Rectal exam under anesthesia with possible fistulotomy possible amos Isbell MD, ELIEL  [facility]  Office: [phone]  4/20/2023 8:24 AM       "

## 2023-04-20 NOTE — OP NOTE
[facility] OPERATIVE NOTE    DATE OF SERVICE: 4/20/2023     PROCEDURE NAME:   1. Rectal exam under anesthesia  2. Debridement of chronic abscess cavity/hidradenitis (sharp debridement 8cm x 5cm x 1cm - skin/soft tissue)     PRE-PROCEDURE DIAGNOSIS: Anal fistula     POST-PROCEDURE DIAGNOSIS: Hidradenitis     SURGEON: Mega Isbell MD     ASSISTANT: None     FINDINGS: There were 3 areas that appear to be external fistula openings.  However there was no connection to the anal canal and on closer examination, this was clearly hidradenitis.  There were multiple tunneling tracts between this area, and the area of skin had to be resected in order to properly debride the underlying tissue.     ESTIMATED BLOOD LOSS: 10 mL     ANESTHESIA: General     SPECIMEN: A: Perianal skin, question hidradenitis    INDICATIONS FOR PROCEDURE:  Ricki Kilpatrick is a 42 year old male presenting with recurrent anorectal abscesses and possible fistula.  The risks and benefits of surgery were discussed with the patient including infection, abscess recurrence, need for further operative interventions, possible damage to the sphincter and incontinence (or changes in continence) and increased pain and bleeding were discussed with the patient. Alternatives were also discussed. The patient agreed to proceed with surgery and informed consent was obtained.     DESCRIPTION OF PROCEDURE:  The patient was taken to the operating room and placed under general anesthesia. They were then placed in the prone odin knife position on the operating room table. The buttocks were taped apart. The surgical area was then prepped and draped in the usual sterile fashion. A timeout was performed per protocol.  Examination of the perianal skin was significant for multiple external fistula openings with the superior and inferior openings being 9 cm away from 1 another and the lateral openings being 5 cm from one another this was all on the left side.  Next, a  terminal pudendal nerve block was performed with 40mL of a 50/50 mix of 1% lidocaine with epinephrine and 0.25% bupivicaine. A digital rectal exam was performed which revealed circumferential internal hemorrhoids with no other abnormalities.  Next a lubricated Dutta bivalve anoscope was placed into the anal canal.  Examination of the anal canal was significant for circumferential internal hemorrhoids with no other abnormalities.  There is no evidence of an internal opening.  Pressing down on the area of indurated skin on the left side caused purulence to come out of the external openings but nothing into the anal canal.    Based on these findings I proceeded to evaluate the tracts.  Using a fistula probe, I was able to connect all 3 of these external openings.  There was clearly no deep abscess cavity, but this was subcutaneous tunneling.  There was no connection to the anal canal.  I instilled each of the 3 external openings with hydrogen peroxide closing the other 2 external openings, but there was no extravasation into the anal canal.  This appears to be hidradenitis and not anal fistula disease.  Therefore, I proceeded to unroof the areas of hidradenitis.  There were multiple crisscrossing tracts that needed to be debrided, and eventually rather than leaving just to small partially vascularized paddle of skin in the middle, I excised this as well so I could get a good debridement of all the underlying tissue.  The entire base of the wound was sharply curetted using a curette.  Hemostasis was obtained using electrocautery.  The wound was then covered using a moist gauze.  The resulting wound size was 8 cm x 5 cm x 1 cm.    Hemostasis was confirmed. All sponge, needle and instrument counts were correct at the end of the procedure. The patient tolerated the procedure well and was awakened from anesthesia without difficulty, and transferred to the recovery room in stable condition.    COMPLICATIONS: None  apparent    DISPOSITION: Stable to PACU    Mega Isbell MD, ELIEL  [facility]  Office: [phone]  4/20/2023 8:35 AM

## 2023-04-20 NOTE — ANESTHESIA POSTPROCEDURE EVALUATION
Patient: Ricki Kilpatrick    Procedure: Procedure(s):  EXAM UNDER ANESTHESIA DEBRIDEMENT OF CHRONIC PERIANAL ABESS CAVITY, EXCISION OF HYDROADENITIS         Anesthesia Type:  General    Note:  Disposition: Outpatient   Postop Pain Control: Uneventful            Sign Out: Well controlled pain   PONV: No   Neuro/Psych: Uneventful            Sign Out: Acceptable/Baseline neuro status   Airway/Respiratory: Uneventful            Sign Out: Acceptable/Baseline resp. status   CV/Hemodynamics: Uneventful            Sign Out: Acceptable CV status; No obvious hypovolemia; No obvious fluid overload   Other NRE: NONE   DID A NON-ROUTINE EVENT OCCUR? No           Last vitals:  Vitals Value Taken Time   /95 04/20/23 1015   Temp 97  F (36.1  C) 04/20/23 0943   Pulse 88 04/20/23 1015   Resp 16 04/20/23 1015   SpO2 98 % 04/20/23 1015       Electronically Signed By: Cheng Sexton MD  April 20, 2023  10:27 AM

## 2023-05-02 NOTE — ADDENDUM NOTE
Encounter addended by: Rosario Mckenzie LADC on: 5/2/2023 9:54 AM   Actions taken: Clinical Note Signed

## 2023-05-20 ENCOUNTER — HEALTH MAINTENANCE LETTER (OUTPATIENT)
Age: 43
End: 2023-05-20

## 2023-06-07 ENCOUNTER — TRANSCRIBE ORDERS (OUTPATIENT)
Dept: VASCULAR SURGERY | Facility: CLINIC | Age: 43
End: 2023-06-07
Payer: COMMERCIAL

## 2023-06-07 DIAGNOSIS — L73.2 HIDRADENITIS SUPPURATIVA OF ANUS: Primary | ICD-10-CM

## 2023-08-31 ENCOUNTER — TRANSFERRED RECORDS (OUTPATIENT)
Dept: HEALTH INFORMATION MANAGEMENT | Facility: CLINIC | Age: 43
End: 2023-08-31
Payer: COMMERCIAL

## 2023-09-15 ENCOUNTER — TRANSFERRED RECORDS (OUTPATIENT)
Dept: HEALTH INFORMATION MANAGEMENT | Facility: CLINIC | Age: 43
End: 2023-09-15
Payer: COMMERCIAL

## 2023-09-15 LAB
ALT SERPL-CCNC: 18 U/L (ref 9–46)
AST SERPL-CCNC: 18 U/L (ref 10–40)
CREATININE (EXTERNAL): 1.08 MG/DL (ref 0.6–1.29)
GFR ESTIMATED (EXTERNAL): 87 ML/MIN/1.73M2
GLUCOSE (EXTERNAL): 87 MG/DL (ref 65–99)
HEP C HIM: NORMAL
POTASSIUM (EXTERNAL): 4.6 MMOL/L (ref 3.5–5.3)

## 2023-09-19 ENCOUNTER — TRANSFERRED RECORDS (OUTPATIENT)
Dept: HEALTH INFORMATION MANAGEMENT | Facility: CLINIC | Age: 43
End: 2023-09-19
Payer: COMMERCIAL

## 2023-10-05 ENCOUNTER — LAB (OUTPATIENT)
Dept: LAB | Facility: CLINIC | Age: 43
End: 2023-10-05
Payer: COMMERCIAL

## 2023-10-05 ENCOUNTER — OFFICE VISIT (OUTPATIENT)
Dept: INFECTIOUS DISEASES | Facility: CLINIC | Age: 43
End: 2023-10-05
Payer: COMMERCIAL

## 2023-10-05 VITALS
HEIGHT: 65 IN | WEIGHT: 170.1 LBS | DIASTOLIC BLOOD PRESSURE: 78 MMHG | OXYGEN SATURATION: 97 % | SYSTOLIC BLOOD PRESSURE: 118 MMHG | TEMPERATURE: 98.2 F | BODY MASS INDEX: 28.34 KG/M2 | HEART RATE: 90 BPM

## 2023-10-05 DIAGNOSIS — L73.2 HIDRADENITIS SUPPURATIVA: ICD-10-CM

## 2023-10-05 DIAGNOSIS — L73.2 HIDRADENITIS SUPPURATIVA: Primary | ICD-10-CM

## 2023-10-05 PROCEDURE — 99204 OFFICE O/P NEW MOD 45 MIN: CPT | Performed by: INTERNAL MEDICINE

## 2023-10-05 PROCEDURE — 36415 COLL VENOUS BLD VENIPUNCTURE: CPT

## 2023-10-05 PROCEDURE — 86481 TB AG RESPONSE T-CELL SUSP: CPT

## 2023-10-05 RX ORDER — CLINDAMYCIN PHOSPHATE 10 MG/G
GEL TOPICAL 2 TIMES DAILY
Qty: 30 G | Refills: 3 | Status: SHIPPED | OUTPATIENT
Start: 2023-10-05

## 2023-10-05 NOTE — PROGRESS NOTES
[facility] INFECTIOUS DISEASE CLINIC [facility]    Date: 10/05/2023   Patient Name: Ricki Kilpatrick   YOB: 1980  MRN: [id number]      ASSESSMENT:  43-year-old otherwise healthy man referred to ID clinic for longstanding left buttock lesion    Possible hidradenitis suppurativa.  Developed left buttock lesion almost a year ago.  Seen by colorectal surgery and underwent debridement in the OR.  No sign of perianal fistula.  Biopsy at that time showed skin with dermal fibrosis, moderate chronic inflammation, focal acute inflammation and pigment laden macrophages, without evidence for malignancy.  There was also an addendum attached that these findings could be consistent with nodular hidradenitis in the appropriate clinical setting.  The patient had a punch biopsy following dermatology referral in August which showed mixed dermal inflammatory infiltrate with neutrophils, lymphocytes and histiocytes.  These findings were thought to be concerning for infection.  Special stains for microorganisms were negative.  Bacterial cultures from the punch biopsy were negative.  Fungal and AFB cultures were sent and negative at 3 weeks, however these are still pending.  Subsequent wound culture collected on 9/15 did grow scant Pseudomonas aeruginosa and other skin bebeto.  The patient has completed a week of Cipro and 2 weeks of doxycycline.    PLAN:  -Exam and clinical course resembles hidradenitis suppurativa, over atypical abscess.  -However, will need to follow-up with fungal and mycobacterial cultures.  Our clinic will request updated results  -Defer management of hidradenitis suppurativa to dermatology.  It appears that tentative plans has been made to initiate Humira  -We will start clindamycin gel to affected area  -Discussed with patient that hidradenitis suppurativa is not a primarily infectious problem, however these wounds can become secondarily infected.  If he does notice acute worsening with pain and/or fevers, he  may need to be admitted for surgical debridement and IV antibiotics  -Since the patient may initiate Humira, will order QuantiFERON      Return to clinic as needed.    Elia Martin MD  [facility]   Clinic phone: [phone]   Clinic fax: [phone]     ______________________________________________________________________    HISTORY OF PRESENT ILLNESS:   Ricki Kilpatrick is a 43 year old man who is referred for evaluation of left buttock abscess.  The patient developed a small lesion on his left buttock about a year ago.  He describes this as a cyst.  Initially it was small, occasionally leaking.  At the end of February the seem to worsen therefore he presented to [facility] ER for evaluation.  At that time he underwent a CT scan of his pelvis that showed an area of thickening with fat stranding, but no obvious abscess.  He reports that they attempted an aspiration without success.  There was some question on imaging whether a perianal fistula could be present, therefore he was referred to colorectal surgery.  He was seen by Dr. Isbell and underwent debridement under anesthesia and seton placement.  Per Dr. Isbell's notes there was suspicion for hidradenitis suppurativa, of note no perianal fistulas were seen.  He was referred to dermatology.  Has been seen by Dr. Zeb Howell.  He underwent a punch biopsy in August that showed inflammatory infiltrate, consistent with infection.  Tissue staining was negative for any microorganisms.  Tissue was also sent for bacterial fungal, and AFB cultures.  These have been negative to date.  He was seen again on 9/15 and had a wound culture which showed scant Pseudomonas and other skin bebeto.  Labs at that time showed an elevated white count of 15.  He was given 2 weeks of doxycycline and 1 week of ciprofloxacin.  Susceptibilities of the Pseudomonas showed that it was pansensitive.  Today, he reports the presence of an ongoing lesion on his left  buttock.  This is uncomfortable to put weight on.  He denies any fevers.  He denies any other similar lesions on his body.    The patient was born in [place] and lived there until age 11 before moving to the [place].  He denies any other medical problems.  He does not take any other medications.  He does smoke.  Denies TB contacts.  Denies hot tub use.      Interval History:  Not applicable      Review of Systems:  Ten systems reviewed and negative except for what is noted in the HPI         Past Medical History:  No past medical history on file.    Past Surgical History:  Past Surgical History:   Procedure Laterality Date    EXAM UNDER ANESTHESIA, CHANGE DRESSING (LOCATION), COMBINED N/A 4/20/2023    Procedure: EXAM UNDER ANESTHESIA DEBRIDEMENT OF CHRONIC PERIANAL ABESS CAVITY, EXCISION OF HYDROADENITIS  ;  Surgeon: Mega Isbell MD;  Location: [facility]       Allergies:  No Known Allergies    Medications:    Current Outpatient Medications:     chlorthalidone (HYGROTON) 25 MG tablet, Take 1 tablet (25 mg) by mouth daily, Disp: 30 tablet, Rfl: 0    methocarbamol (ROBAXIN) 750 MG tablet, Take 1 tablet (750 mg) by mouth 3 times daily as needed for muscle spasms, Disp: 20 tablet, Rfl: 0    oxyCODONE (ROXICODONE) 5 MG tablet, Take 1-2 tablets (5-10 mg) by mouth every 4 hours as needed for severe pain, Disp: 12 tablet, Rfl: 0    Social History:  Social History     Socioeconomic History    Marital status: Single     Spouse name: Not on file    Number of children: Not on file    Years of education: Not on file    Highest education level: Not on file   Occupational History    Not on file   Tobacco Use    Smoking status: Every Day     Packs/day: 0.30     Types: Cigarettes    Smokeless tobacco: Never   Substance and Sexual Activity    Alcohol use: Yes     Comment: 1 year 7 months Sober    Drug use: Never    Sexual activity: Not on file   Other Topics Concern    Not on file   Social History Narrative    Not on  "file     Social Determinants of Health     Financial Resource Strain: Not on file   Food Insecurity: Not on file   Transportation Needs: Not on file   Physical Activity: Not on file   Stress: Not on file   Social Connections: Not on file   Interpersonal Safety: Not on file   Housing Stability: Not on file        Family History:  No family history on file.        PHYSICAL EXAM:    /78 (BP Location: Right arm, Patient Position: Sitting, Cuff Size: Adult Regular)   Pulse 90   Temp 98.2  F (36.8  C)   Ht 1.651 m (5' 5\")   Wt 77.2 kg (170 lb 1.6 oz)   SpO2 97%   BMI 28.31 kg/m      GENERAL:  well-developed, well-nourished, in no acute distress.   HENT:  Head is normocephalic, atraumatic. Oropharynx is moist without exudates or ulcers.  EYES:  Eyes have anicteric sclerae without conjunctival injection or stigmata of endocarditis.    NECK:  Supple. No cervical lymphadenopathy  LUNGS:  Clear to auscultation.  CARDIOVASCULAR:  Regular rate and rhythm with no murmurs, gallops or rubs.  ABDOMEN:  Normal bowel sounds, soft, nontender. No hepatosplenomegaly.  MUSCULOSKELETAL: Extremities warm and without edema. No joint swelling.  SKIN: Large area of induration on the left buttock.  Single area of exudative drainage.  Large denuded area near the anus that has a clean base of granulation.  No perianal lesions.  No other skin lesions.  NEUROLOGIC: Grossly nonfocal. Normal gait and station        Pertinent labs:    Lab Results   Component Value Date     02/27/2023    POTASSIUM 4.8 02/27/2023    CHLORIDE 105 02/27/2023    CO2 20 (L) 02/27/2023    BUN 13.6 02/27/2023    CR 0.91 02/27/2023    GLC 79 02/27/2023       Lab Results   Component Value Date    WBC 18.6 (H) 02/27/2023    HGB 15.9 02/27/2023    HCT 48.1 02/27/2023     02/27/2023    MCV 95 02/27/2023    RDW 12.3 02/27/2023        Lab Results   Component Value Date    BILITOTAL 0.9 02/27/2023    AST 41 02/27/2023    ALT 18 02/27/2023    PROTTOTAL 8.9 (H) " 02/27/2023    ALBUMIN 3.9 02/27/2023    ALKPHOS 102 02/27/2023    INR 1.00 09/03/2021            MICROBIOLOGY DATA:  8/31 tissue culture: Negative  8/31 fungal culture: No growth to date  8/31 AFB stain negative; culture negative at 3 weeks    9/15 wound culture: Scant Pseudomonas, pansensitive plus skin bebeto    RADIOLOGY:  Reviewed and summarized above    Attestation:  Total time preparing to see this patient, face-to-face time, and coordinating care time on the same calendar date: 53 minutes.  Face-face time: 34 minutes.  Over 50% of face-to-face time was spent in counseling/coordination of care.

## 2023-10-05 NOTE — PATIENT INSTRUCTIONS
Question of possible Hidradenitis Suppurativa. We will need to follow-up on the cultures from 8/31/23 to make sure there is not atypical infection.    Start clindamycin gel twice daily to your buttock    Recommend follow-up with your Dermatologist.    Call with questions

## 2023-10-05 NOTE — LETTER
10/5/2023         RE: Ricki Kilpatrick  [address]  [facility] [address]        Dear Colleague,    Thank you for referring your patient, Ricki Kilpatrick, to the [facility]. Please see a copy of my visit note below.    [facility] INFECTIOUS DISEASE CLINIC - [place]    Date: 10/05/2023   Patient Name: Ricki Kilpatrick   YOB: 1980  MRN: [id number]      ASSESSMENT:  43-year-old otherwise healthy man referred to ID clinic for longstanding left buttock lesion    Possible hidradenitis suppurativa.  Developed left buttock lesion almost a year ago.  Seen by colorectal surgery and underwent debridement in the OR.  No sign of perianal fistula.  Biopsy at that time showed skin with dermal fibrosis, moderate chronic inflammation, focal acute inflammation and pigment laden macrophages, without evidence for malignancy.  There was also an addendum attached that these findings could be consistent with nodular hidradenitis in the appropriate clinical setting.  The patient had a punch biopsy following dermatology referral in August which showed mixed dermal inflammatory infiltrate with neutrophils, lymphocytes and histiocytes.  These findings were thought to be concerning for infection.  Special stains for microorganisms were negative.  Bacterial cultures from the punch biopsy were negative.  Fungal and AFB cultures were sent and negative at 3 weeks, however these are still pending.  Subsequent wound culture collected on 9/15 did grow scant Pseudomonas aeruginosa and other skin bebeto.  The patient has completed a week of Cipro and 2 weeks of doxycycline.    PLAN:  -Exam and clinical course resembles hidradenitis suppurativa, over atypical abscess.  -However, will need to follow-up with fungal and mycobacterial cultures.  Our clinic will request updated results  -Defer management of hidradenitis suppurativa to dermatology.  It appears that tentative plans has been made to initiate Humira  -We will start  clindamycin gel to affected area  -Discussed with patient that hidradenitis suppurativa is not a primarily infectious problem, however these wounds can become secondarily infected.  If he does notice acute worsening with pain and/or fevers, he may need to be admitted for surgical debridement and IV antibiotics  -Since the patient may initiate Humira, will order QuantiFERON      Return to clinic as needed.    Elia Martin MD  [facility]   Clinic phone: [phone]   Clinic fax: [phone]     ______________________________________________________________________    HISTORY OF PRESENT ILLNESS:   Ricki Kilpatrick is a 43 year old man who is referred for evaluation of left buttock abscess.  The patient developed a small lesion on his left buttock about a year ago.  He describes this as a cyst.  Initially it was small, occasionally leaking.  At the end of February the seem to worsen therefore he presented to [facility] ER for evaluation.  At that time he underwent a CT scan of his pelvis that showed an area of thickening with fat stranding, but no obvious abscess.  He reports that they attempted an aspiration without success.  There was some question on imaging whether a perianal fistula could be present, therefore he was referred to colorectal surgery.  He was seen by Dr. Isbell and underwent debridement under anesthesia and seton placement.  Per Dr. Isbell's notes there was suspicion for hidradenitis suppurativa, of note no perianal fistulas were seen.  He was referred to dermatology.  Has been seen by Dr. Zeb Howell.  He underwent a punch biopsy in August that showed inflammatory infiltrate, consistent with infection.  Tissue staining was negative for any microorganisms.  Tissue was also sent for bacterial fungal, and AFB cultures.  These have been negative to date.  He was seen again on 9/15 and had a wound culture which showed scant Pseudomonas and other skin bebeto.  Labs at that time  showed an elevated white count of 15.  He was given 2 weeks of doxycycline and 1 week of ciprofloxacin.  Susceptibilities of the Pseudomonas showed that it was pansensitive.  Today, he reports the presence of an ongoing lesion on his left buttock.  This is uncomfortable to put weight on.  He denies any fevers.  He denies any other similar lesions on his body.    The patient was born in [facility] and lived there until age 11 before moving to the [place].  He denies any other medical problems.  He does not take any other medications.  He does smoke.  Denies TB contacts.  Denies hot tub use.      Interval History:  Not applicable      Review of Systems:  Ten systems reviewed and negative except for what is noted in the HPI         Past Medical History:  No past medical history on file.    Past Surgical History:  Past Surgical History:   Procedure Laterality Date     EXAM UNDER ANESTHESIA, CHANGE DRESSING (LOCATION), COMBINED N/A 4/20/2023    Procedure: EXAM UNDER ANESTHESIA DEBRIDEMENT OF CHRONIC PERIANAL ABESS CAVITY, EXCISION OF HYDROADENITIS  ;  Surgeon: Mega Isbell MD;  Location: [facility]       Allergies:  No Known Allergies    Medications:    Current Outpatient Medications:      chlorthalidone (HYGROTON) 25 MG tablet, Take 1 tablet (25 mg) by mouth daily, Disp: 30 tablet, Rfl: 0     methocarbamol (ROBAXIN) 750 MG tablet, Take 1 tablet (750 mg) by mouth 3 times daily as needed for muscle spasms, Disp: 20 tablet, Rfl: 0     oxyCODONE (ROXICODONE) 5 MG tablet, Take 1-2 tablets (5-10 mg) by mouth every 4 hours as needed for severe pain, Disp: 12 tablet, Rfl: 0    Social History:  Social History     Socioeconomic History     Marital status: Single     Spouse name: Not on file     Number of children: Not on file     Years of education: Not on file     Highest education level: Not on file   Occupational History     Not on file   Tobacco Use     Smoking status: Every Day     Packs/day: 0.30     Types:  "Cigarettes     Smokeless tobacco: Never   Substance and Sexual Activity     Alcohol use: Yes     Comment: 1 year 7 months Sober     Drug use: Never     Sexual activity: Not on file   Other Topics Concern     Not on file   Social History Narrative     Not on file     Social Determinants of Health     Financial Resource Strain: Not on file   Food Insecurity: Not on file   Transportation Needs: Not on file   Physical Activity: Not on file   Stress: Not on file   Social Connections: Not on file   Interpersonal Safety: Not on file   Housing Stability: Not on file        Family History:  No family history on file.        PHYSICAL EXAM:    /78 (BP Location: Right arm, Patient Position: Sitting, Cuff Size: Adult Regular)   Pulse 90   Temp 98.2  F (36.8  C)   Ht 1.651 m (5' 5\")   Wt 77.2 kg (170 lb 1.6 oz)   SpO2 97%   BMI 28.31 kg/m      GENERAL:  well-developed, well-nourished, in no acute distress.   HENT:  Head is normocephalic, atraumatic. Oropharynx is moist without exudates or ulcers.  EYES:  Eyes have anicteric sclerae without conjunctival injection or stigmata of endocarditis.    NECK:  Supple. No cervical lymphadenopathy  LUNGS:  Clear to auscultation.  CARDIOVASCULAR:  Regular rate and rhythm with no murmurs, gallops or rubs.  ABDOMEN:  Normal bowel sounds, soft, nontender. No hepatosplenomegaly.  MUSCULOSKELETAL: Extremities warm and without edema. No joint swelling.  SKIN: Large area of induration on the left buttock.  Single area of exudative drainage.  Large denuded area near the anus that has a clean base of granulation.  No perianal lesions.  No other skin lesions.  NEUROLOGIC: Grossly nonfocal. Normal gait and station        Pertinent labs:    Lab Results   Component Value Date     02/27/2023    POTASSIUM 4.8 02/27/2023    CHLORIDE 105 02/27/2023    CO2 20 (L) 02/27/2023    BUN 13.6 02/27/2023    CR 0.91 02/27/2023    GLC 79 02/27/2023       Lab Results   Component Value Date    WBC 18.6 " (H) 02/27/2023    HGB 15.9 02/27/2023    HCT 48.1 02/27/2023     02/27/2023    MCV 95 02/27/2023    RDW 12.3 02/27/2023        Lab Results   Component Value Date    BILITOTAL 0.9 02/27/2023    AST 41 02/27/2023    ALT 18 02/27/2023    PROTTOTAL 8.9 (H) 02/27/2023    ALBUMIN 3.9 02/27/2023    ALKPHOS 102 02/27/2023    INR 1.00 09/03/2021            MICROBIOLOGY DATA:  8/31 tissue culture: Negative  8/31 fungal culture: No growth to date  8/31 AFB stain negative; culture negative at 3 weeks    9/15 wound culture: Scant Pseudomonas, pansensitive plus skin bebeto    RADIOLOGY:  Reviewed and summarized above    Attestation:  Total time preparing to see this patient, face-to-face time, and coordinating care time on the same calendar date: 53 minutes.  Face-face time: 34 minutes.  Over 50% of face-to-face time was spent in counseling/coordination of care.      Again, thank you for allowing me to participate in the care of your patient.        Sincerely,        Elia Martin MD

## 2023-10-06 LAB
GAMMA INTERFERON BACKGROUND BLD IA-ACNC: 0.22 IU/ML
M TB IFN-G BLD-IMP: NEGATIVE
M TB IFN-G CD4+ BCKGRND COR BLD-ACNC: 9.78 IU/ML
MITOGEN IGNF BCKGRD COR BLD-ACNC: -0.09 IU/ML
MITOGEN IGNF BCKGRD COR BLD-ACNC: -0.11 IU/ML
QUANTIFERON MITOGEN: 10 IU/ML
QUANTIFERON NIL TUBE: 0.22 IU/ML
QUANTIFERON TB1 TUBE: 0.11 IU/ML
QUANTIFERON TB2 TUBE: 0.13

## 2024-07-27 ENCOUNTER — HEALTH MAINTENANCE LETTER (OUTPATIENT)
Age: 44
End: 2024-07-27

## 2025-08-10 ENCOUNTER — HEALTH MAINTENANCE LETTER (OUTPATIENT)
Age: 45
End: 2025-08-10